# Patient Record
Sex: FEMALE | Race: WHITE | Employment: OTHER | URBAN - METROPOLITAN AREA
[De-identification: names, ages, dates, MRNs, and addresses within clinical notes are randomized per-mention and may not be internally consistent; named-entity substitution may affect disease eponyms.]

---

## 2017-03-07 ENCOUNTER — HOSPITAL ENCOUNTER (OUTPATIENT)
Dept: WOMENS IMAGING | Age: 77
Discharge: HOME | End: 2017-03-07
Attending: FAMILY MEDICINE | Admitting: FAMILY MEDICINE

## 2017-03-07 DIAGNOSIS — Z12.31 VISIT FOR SCREENING MAMMOGRAM: ICD-10-CM

## 2018-02-05 ENCOUNTER — PAT TELEPHONE (OUTPATIENT)
Dept: PREADMISSION TESTING | Age: 78
End: 2018-02-05

## 2018-02-05 VITALS — BODY MASS INDEX: 28.47 KG/M2 | HEIGHT: 60 IN | WEIGHT: 145 LBS

## 2018-02-05 RX ORDER — DICYCLOMINE HYDROCHLORIDE 10 MG/1
10 CAPSULE ORAL
COMMUNITY

## 2018-02-05 NOTE — PROGRESS NOTES
[address] [facility] time___0800_________        Surgery time__0900__________    Take the following medications with a sip of water:    Do not eat or drink anything after 12:00 midnight prior to your surgery. PT TO GET ENEMAS UPON ARRIVAL  This includes water chewing gum, mints and ice chips. You may brush your teeth and gargle the morning of your surgery, but do not swallow the water       You may be asked to stop blood thinners such as Coumadin, Plavix, Fragmin, Lovenox, etc., or any anti-inflammatories such as:  Aspirin, Ibuprofen, Advil, Naproxen prior to your surgery. We also ask that you stop any OTC medications such as fish oil, vitamin E, glucosamine, garlic, Multivitamins, COQ 10, etc.    We ask that you do not smoke 24 hours prior to surgery  We ask that you do not  drink any alcoholic beverages 24 hours prior to surgery     You must make arrangements for a responsible adult to take you home after your surgery. For your safety you will not be allowed to leave alone or drive yourself home. Your surgery will be cancelled if you do not have a ride home. Also for your safety, it is strongly suggested that someone stay with you the first 24 hours after your surgery. A parent or legal guardian must accompany a child scheduled for surgery and plan to stay at the hospital until the child is discharged. Please do not bring other children with you. For your comfort, please wear simple loose fitting clothing to the hospital.  Please do not bring valuables. Do not wear any make-up or nail polish on your fingers or toes      For your safety, please do not wear any jewelry or body piercing's on the day of surgery. All jewelry must be removed. If you have dentures, they will be removed before going to operating room. For your convenience, we will provide you with a container.     If you wear contact lenses or glasses, they will be removed, please

## 2018-02-13 ENCOUNTER — HOSPITAL ENCOUNTER (OUTPATIENT)
Dept: ENDOSCOPY | Age: 78
Discharge: HOME | End: 2018-02-13
Attending: INTERNAL MEDICINE | Admitting: INTERNAL MEDICINE

## 2018-02-13 VITALS
SYSTOLIC BLOOD PRESSURE: 110 MMHG | WEIGHT: 146.5 LBS | RESPIRATION RATE: 16 BRPM | DIASTOLIC BLOOD PRESSURE: 59 MMHG | OXYGEN SATURATION: 98 % | TEMPERATURE: 97.9 F | HEART RATE: 59 BPM | BODY MASS INDEX: 28.61 KG/M2

## 2018-02-13 RX ORDER — ONDANSETRON 2 MG/ML
4 INJECTION INTRAMUSCULAR; INTRAVENOUS
Status: ACTIVE | OUTPATIENT
Start: 2018-02-13 | End: 2018-02-13

## 2018-02-13 RX ORDER — SODIUM CHLORIDE 9 MG/ML
INJECTION, SOLUTION INTRAVENOUS CONTINUOUS
Status: DISCONTINUED | OUTPATIENT
Start: 2018-02-13 | End: 2018-02-14 | Stop reason: HOSPADM

## 2018-02-13 RX ORDER — SODIUM CHLORIDE 0.9 % (FLUSH) 0.9 %
10 SYRINGE (ML) INJECTION EVERY 12 HOURS SCHEDULED
Status: DISCONTINUED | OUTPATIENT
Start: 2018-02-13 | End: 2018-02-14 | Stop reason: HOSPADM

## 2018-02-13 RX ORDER — SODIUM PHOSPHATE, DIBASIC AND SODIUM PHOSPHATE, MONOBASIC 7; 19 G/133ML; G/133ML
1 ENEMA RECTAL
Status: COMPLETED | OUTPATIENT
Start: 2018-02-13 | End: 2018-02-13

## 2018-02-13 RX ORDER — SODIUM CHLORIDE 0.9 % (FLUSH) 0.9 %
10 SYRINGE (ML) INJECTION PRN
Status: DISCONTINUED | OUTPATIENT
Start: 2018-02-13 | End: 2018-02-14 | Stop reason: HOSPADM

## 2018-02-13 RX ADMIN — SODIUM CHLORIDE: 9 INJECTION, SOLUTION INTRAVENOUS at 08:46

## 2018-02-13 RX ADMIN — SODIUM PHOSPHATE, DIBASIC AND SODIUM PHOSPHATE, MONOBASIC 1 ENEMA: 7; 19 ENEMA RECTAL at 08:34

## 2018-02-13 ASSESSMENT — LIFESTYLE VARIABLES: SMOKING_STATUS: 0

## 2018-02-13 ASSESSMENT — PAIN SCALES - GENERAL
PAINLEVEL_OUTOF10: 0

## 2018-02-13 ASSESSMENT — ENCOUNTER SYMPTOMS: SHORTNESS OF BREATH: 0

## 2018-02-13 ASSESSMENT — PAIN - FUNCTIONAL ASSESSMENT: PAIN_FUNCTIONAL_ASSESSMENT: 0-10

## 2018-02-13 NOTE — ANESTHESIA PRE-OP
[facility] Department of Anesthesiology  Pre-Anesthesia Evaluation/Consultation       Name:  Faviola Samuel  : 1940  Age:  65 y.o. MRN:  [id number]  Date: 2018           Procedure (Scheduled):  Flex sig  Surgeon:  Dr. Scott Jacome     There is no problem list on file for this patient.     Past Medical History:   Diagnosis Date    Hyperlipidemia     Hypertension      Past Surgical History:   Procedure Laterality Date    APPENDECTOMY      COLONOSCOPY  2016    Dr. Danisha Ludwig ARTHROSCOPY       Social History   Substance Use Topics    Smoking status: Never Smoker    Smokeless tobacco: Never Used    Alcohol use Yes     Medications  Current Outpatient Prescriptions on File Prior to Encounter   Medication Sig Dispense Refill    dicyclomine (BENTYL) 10 MG capsule Take 10 mg by mouth 4 times daily (before meals and nightly)      levothyroxine (SYNTHROID) 88 MCG tablet Take 88 mcg by mouth Daily      hydrochlorothiazide (HYDRODIURIL) 25 MG tablet Take 25 mg by mouth daily      lovastatin (MEVACOR) 10 MG tablet Take 10 mg by mouth nightly      estrogens, conjugated, (PREMARIN) 0.3 MG tablet Take 0.3 mg by mouth daily Indications: takes 1/2 tab      fluticasone (FLONASE) 50 MCG/ACT nasal spray 1 spray by Nasal route daily      Loratadine 10 MG CAPS Take by mouth      Probiotic Product (ALIGN PO) Take by mouth      omeprazole (PRILOSEC) 20 MG delayed release capsule Take 20 mg by mouth daily      OMEGA-3 KRILL OIL PO Take 350 mg by mouth daily      Coenzyme Q10 (CO Q 10 PO) Take by mouth daily      celecoxib (CELEBREX) 200 MG capsule Take 200 mg by mouth 2 times daily      Cholecalciferol (VITAMIN D) 2000 UNITS CAPS capsule Take by mouth      Psyllium (METAMUCIL PO) Take by mouth      Zinc 30 MG CAPS Take by mouth      aspirin 81 MG tablet Take 81 mg by mouth daily Indications: takes every other day       No current facility-administered medications on file prior to encounter. Current Outpatient Prescriptions   Medication Sig Dispense Refill    dicyclomine (BENTYL) 10 MG capsule Take 10 mg by mouth 4 times daily (before meals and nightly)      levothyroxine (SYNTHROID) 88 MCG tablet Take 88 mcg by mouth Daily      hydrochlorothiazide (HYDRODIURIL) 25 MG tablet Take 25 mg by mouth daily      lovastatin (MEVACOR) 10 MG tablet Take 10 mg by mouth nightly      estrogens, conjugated, (PREMARIN) 0.3 MG tablet Take 0.3 mg by mouth daily Indications: takes 1/2 tab      fluticasone (FLONASE) 50 MCG/ACT nasal spray 1 spray by Nasal route daily      Loratadine 10 MG CAPS Take by mouth      Probiotic Product (ALIGN PO) Take by mouth      omeprazole (PRILOSEC) 20 MG delayed release capsule Take 20 mg by mouth daily      OMEGA-3 KRILL OIL PO Take 350 mg by mouth daily      Coenzyme Q10 (CO Q 10 PO) Take by mouth daily      celecoxib (CELEBREX) 200 MG capsule Take 200 mg by mouth 2 times daily      Cholecalciferol (VITAMIN D) 2000 UNITS CAPS capsule Take by mouth      Psyllium (METAMUCIL PO) Take by mouth      Zinc 30 MG CAPS Take by mouth      aspirin 81 MG tablet Take 81 mg by mouth daily Indications: takes every other day       Current Facility-Administered Medications   Medication Dose Route Frequency Provider Last Rate Last Dose    sodium chloride flush 0.9 % injection 10 mL  10 mL Intravenous 2 times per day Mortimer Plaster, MD        sodium chloride flush 0.9 % injection 10 mL  10 mL Intravenous PRN Mortimer Plaster, MD         Vital Signs (Current) There were no vitals filed for this visit. Vital Signs Statistics (for past 48 hrs)     No Data Recorded    BP Readings from Last 3 Encounters:   11/01/16 133/75     BMI  There is no height or weight on file to calculate BMI.   Estimated body mass index is

## 2018-02-13 NOTE — ANESTHESIA POST-OP
[facility] Department of Anesthesiology  Post-Anesthesia Note       Name:  Kirti Holt                                         Age:  65 y.o.   MRN:  [id number]     Last Vitals & Oxygen Saturation: BP (!) 110/59   Pulse 59   Temp 97.9 °F (36.6 °C) (Temporal)   Resp 16   Wt 146 lb 8 oz (66.5 kg)   SpO2 98%   BMI 28.61 kg/m²   Patient Vitals for the past 4 hrs:   BP Temp Temp src Pulse Resp SpO2 Weight   02/13/18 0937 (!) 110/59 - - 59 16 98 % -   02/13/18 0923 (!) 102/58 - - 59 18 99 % -   02/13/18 0915 (!) 104/49 97.9 °F (36.6 °C) Temporal 63 16 90 % -   02/13/18 0827 132/79 97.9 °F (36.6 °C) Temporal 75 16 95 % 146 lb 8 oz (66.5 kg)       Level of consciousness: awake, alert and oriented    Respiratory: stable     Cardiovascular: stable     Hydration: stable     PONV: stable     Post-op pain: adequate analgesia    Post-op assessment: no apparent anesthetic complications    Complications:  none    Abhishek Perry MD  February 13, 2018   9:41 AM

## 2018-04-13 ENCOUNTER — HOSPITAL ENCOUNTER (OUTPATIENT)
Dept: WOMENS IMAGING | Age: 78
Discharge: HOME | End: 2018-04-13
Attending: FAMILY MEDICINE | Admitting: FAMILY MEDICINE

## 2018-04-13 DIAGNOSIS — Z12.31 VISIT FOR SCREENING MAMMOGRAM: ICD-10-CM

## 2019-07-19 ENCOUNTER — HOSPITAL ENCOUNTER (OUTPATIENT)
Dept: WOMENS IMAGING | Age: 79
Discharge: HOME | End: 2019-07-19
Payer: MEDICARE

## 2019-07-19 DIAGNOSIS — Z12.31 VISIT FOR SCREENING MAMMOGRAM: ICD-10-CM

## 2019-07-19 PROCEDURE — 77063 BREAST TOMOSYNTHESIS BI: CPT

## 2020-09-24 ENCOUNTER — HOSPITAL ENCOUNTER (OUTPATIENT)
Dept: PHYSICAL THERAPY | Age: 80
Setting detail: THERAPIES SERIES
Discharge: HOME | End: 2020-09-24
Payer: MEDICARE

## 2020-09-24 PROCEDURE — 97110 THERAPEUTIC EXERCISES: CPT | Performed by: PHYSICAL THERAPIST

## 2020-09-24 PROCEDURE — 97530 THERAPEUTIC ACTIVITIES: CPT | Performed by: PHYSICAL THERAPIST

## 2020-09-24 PROCEDURE — 97162 PT EVAL MOD COMPLEX 30 MIN: CPT | Performed by: PHYSICAL THERAPIST

## 2020-09-24 NOTE — PRE-CERTIFICATION NOTE
[facility] - Physical Therapy - Women's Health - Pelvic Floor    [x] [facility]  Phone: [phone]              Fax: [phone]    To: Referring Practitioner: Bryn Rodriguez MD      Patient: Juan Pablo Brasher   : 1940   MRN: [id number]  Evaluation Date: 2020      Diagnosis Information:  · Diagnosis: PF PT - urgency, frequency, UUI, incomplete emptying (R33.9, R39.15, N39.41, R35.0)   · Treatment Diagnosis: impaired muscle control and coordination of pelvic floor muscles and core muscles contributing to urinary frequency and urgency     Physical Therapy Certification/Re-Certification Form  Dear Dr. Jesse Ramirez,  The following patient has been evaluated for physical therapy services and for therapy to continue, insurance typically requires intermittent physician review of the treatment plan. Please review the attached evaluation and/or summary of the patient's plan of care, and verify that you agree therapy should continue by signing the attached document and sending it back to our office. Plan of Care/Treatment to date:  [x] Therapeutic Exercise    [] Modalities:  [x] Therapeutic Activity     [] Ultrasound  [] Electrical Stimulation  [] Gait Training      [] Cervical Traction [] Lumbar Traction  [x] Neuromuscular Re-education    [] Cold/hotpack [] Iontophoresis   [x] Instruction in HEP     Other:  [x] Manual Therapy      []             [] Aquatic Therapy      []                 Frequency/Duration:  See patient every 1-2 weeks initially to ensure patient is performing pelvic floor muscle exercises correctly and then taper, determining frequency of treatments based on progress, for a total for 8-10 sessions. Rehab Potential: [] Excellent [x] Good [] Fair  [] Poor       Electronically signed by:  Margret Bowles, PT  #[id number]      If you have any questions or concerns, please don't hesitate to call.   Thank you for your referral.      Physician Signature:________________________________Date:__________________  By signing above, therapists plan is approved by physician

## 2020-09-24 NOTE — PROGRESS NOTES
Physical Therapy  Women's Health - Pelvic Floor      Initial Assessment and Daily Treatment Note  Date: 2020  Patient Name: Juan Pablo Brasher  MRN: [id number]  : 1940     Treatment Diagnosis: impaired muscle control and coordination of pelvic floor muscles and core muscles contributing to urinary frequency and urgency    Restrictions   NA    Subjective   General  Additional Pertinent Hx: PMH: low back pain, SI/tailbone pain, vision and hearing deficits, osteoporosis, arthritis, bone fracture, IBS, Chron's disease, vaginal childbirth x 4 with episiotomies, prolapse with surgery to correct, hysterectomy  Referring Practitioner: Bryn Rodriguez MD  Referral Date : 20  Diagnosis: PF PT - urgency, frequency, UUI, incomplete emptying (R33.9, R39.15, N39.41, R35.0)  PT Visit Information  Onset Date: 09/15/17  PT Insurance Information: Medicare/ Notizza  Total # of Visits to Date: 1  Subjective  Subjective: Patient reports need to urinate frequenly and often incomplete emptying which began about 3 years ago, but made worse by bladder surgery 2019. Getting slight better with taking medications, but seeking PF PT to be able to sleep better and not have to get up so many times at night - generally up to 3 times/night - \"makes me tired from lack of sleep. \" Reports limiting fluids at night/evening. Severity of problem 5/10. Uses incontinence pads - moderate to max protection ~ 2 pads/day - for both urine and stool due to Chron's disease. Vision/Hearing  Vision  Vision: Impaired(corrected with bifocals)  Hearing  Hearing: Within functional limits(some difficulty hearing but no hear aids)    Orientation       Social/Functional History  Social/Functional History  Type of occupation: housewife  Leisure & Hobbies: [employer], cards  Additional Comments:  with cancer, recently fell and getting weaker - serves as primary caregiver for spouse.     Objective health/irritants, helpful websites resource, constipation, bowel leakage, prolapse, and PF muscle exercises. Discussed use of positional stool with BMs. Educated in PF muscle assessment and treatment using PF model. Patient consented to transvaginal PF muscle assessment and treatment. Assessment   Conditions Requiring Skilled Therapeutic Intervention  Body structures, Functions, Activity limitations: Decreased functional mobility ; Decreased endurance;Decreased strength;Decreased coordination(in regards to PF muscles)  Assessment: Patient presents to PF PT with complaint of needing to urinate frequenly and often incomplete emptying which began about 3 years ago, but made worse by bladder surgery 1/2019. She reports this is improving slightly since she began taking medications, but is seeking PF PT to be able to sleep better and not have to get up so many times at night - generally up to 3 times/night - \"makes me tired from lack of sleep. \"  Patient rates the severity of problem at 5/10. She uses incontinence pads - moderate to max protection ~ 2 pads/day - for both urine and stool due to Chron's disease, with which she was recently diagnosed and which seems to be further complicating her life. After reviewing bowel/bladder behavioral modification information, PT used a pelvic floor model to orient the patient with her pelvic organ and pelvic floor muscles anatomy. Patient verbally consented to a transvaginal pelvic muscle floor muscle assessment which revealed weakness/impaired muscle control. Upon transvaginal pelvic floor muscle examination, patient demonstrates poor to fair control/power of her pelvic floor muscles with inconsistent mind-body connection, and limited coordination. With minimal coaching and coordinating pelvic floor contractions with exhalation during diaphragmatic breathing, patient was able to demonstrate up to 1-2/5 muscle contraction for up to 5 seconds (10 seconds is normal).  Patient would definitely benefit from pelvic floor physical therapy for continued education on healthy bladder/bowel habits and behavioral modifications as well as to improve muscle control/ coordination and endurance of pelvic floor and core muscles, which should ultimately diminish report of urinary frequency, urgency, and incomplete emptying. Patient would like to be able to return to decrease urinary frequency to less than 3 times each night  to allow improved restful night's sleep.     Treatment Diagnosis: impaired muscle control and coordination of pelvic floor muscles and core muscles contributing to urinary frequency and urgency  Prognosis: Good  Decision Making: Medium Complexity  History: PMH: low back pain, SI/tailbone pain, vision and hearing deficits, osteoporosis, arthritis, bone fracture, IBS, Chron's disease, vaginal childbirth x 4 with episiotomies, prolapse with surgery to correct, hysterectomy  Exam: impaired muscle control and coordination of pelvic floor muscles and core muscles contributing to urinary frequency and urgency, see above  Clinical Presentation: evolving clinical symptoms, worse over last 3 years, but improved slightly with medications  REQUIRES PT FOLLOW UP: Yes  Treatment Initiated : 9/24/2020 -  initiated education about healthy bowel and bladder habits, initiated pelvic floor muscle control activities  Activity Tolerance  Activity Tolerance: Patient Tolerated treatment well         Plan   Plan  Specific instructions for Next Treatment: review pelvic floor ex coordinating with diaphragmatic breathing, progress to advanced positions as able; assess behavior modification strategies to promote healthy bowel/bladder habits  Current Treatment Recommendations: Strengthening, Functional Mobility Training, Neuromuscular Re-education, Endurance Training, Manual Therapy - Soft Tissue Mobilization, Home Exercise Program, Safety Education & Training, Patient/Caregiver Education & Training, Positioning  Plan Comment: See patient every 1-2 weeks initially to ensure patient is performing pelvic floor muscle exercises correctly and then taper, determining frequency of treatments based on progress, for a total for 8-10 sessions. Next scheduled appointment on Thursday, 10/1/2020 at 1PM - prefers late AM or PM due to Chron's. OutComes Score  POPDI-6 Score : 20.83 (09/24/20 1238)     CRADI-8 Score : 37.5 (09/24/20 1238)    ROCKY-6 Score : 20.83 (09/24/20 1238)     PFDI-20 Score: 79.16 (09/24/20 1238)             Goals  Short term goals  Time Frame for Short term goals: Patient will in 4-5 sessions:  Short term goal 1: Report using 1-2 behavioral modification strategies to reduce report of urinary frequency and urgency through dietary and mechanical changes, with focus on full emptying of bladder and strengthening/improving endurance of PF muscles. Short term goal 2: Improve score of quality of life index measure, PFDI-20, to 55 (79.16 upon initial eval) or less disability index demonstrating improved quality of life with less life interruptions due to urinary frequency and urgency, allowing patient to fully participate in social activities, including spending time with her spouse/friends. Short term goal 3: Perform HEP for pelvic floor and core muscle groups with minimal direction from therapist as she progresses to become more independent managing her urinary frequency and urgency. Short term goal 4: Rate severity of the problem related to urinary frequency and urgency, to less than 3-4 on scale of 0-10 with 0 being no problem and 10 being the worst - (5/10 reported at intial eval- does not allow urinary urgency and frequency disturb her social life - just wears moderate pad in ancitipation of issues when out of her home). Short term goal 5: Report urinary frequency decrease to no more than 2 times per night on average.   Long term goals  Time Frame for Long term goals : Patient will in 8-10 sessions:  Long term goal 1: Report using 3-4 behavioral modification strategies to reduce report of urinary frequency and urgency through dietary and mechanical changes, with focus on full emptying of bladder and strengthening/improving endurance of PF muscles. Long term goal 2: Improve score of quality of life index measure, PFDI-20, to 40 (79.16 upon initial eval) or less disability index demonstrating improved quality of life with less life interruptions due to urinary frequency and urgency, allowing patient to fully participate in social activities, including spending time with her spouse/friends. Long term goal 3: Perform HEP for pelvic floor and core muscle groups independently as she progresses to self-manage her urinary frequency and urgency. Long term goal 4: Rate severity of the problem related to urinary frequency and urgency, to less than 2-3 on scale of 0-10 with 0 being no problem and 10 being the worst - (5/10 reported at intial eval- does not allow urinary urgency and frequency disturb her social life - just wears moderate pad in ancitipation of issues when out of her home). Long term goal 5: Report urinary frequency decrease to no more than 1-2 times per night on average. Patient Goals   Patient goals : to be able to sleep better and not have to get up so many times at night - generally up to 3 times/night - \"makes me tired from lack of sleep. \"       Therapy Time   Individual Concurrent Group Co-treatment   Time In 1100         Time Out 1230         Minutes 90         Timed Code Treatment Minutes: Niall, PT #[id number]

## 2020-10-01 ENCOUNTER — HOSPITAL ENCOUNTER (OUTPATIENT)
Dept: PHYSICAL THERAPY | Age: 80
Setting detail: THERAPIES SERIES
Discharge: HOME | End: 2020-10-01
Payer: MEDICARE

## 2020-10-01 PROCEDURE — 97110 THERAPEUTIC EXERCISES: CPT | Performed by: PHYSICAL THERAPIST

## 2020-10-01 PROCEDURE — 97530 THERAPEUTIC ACTIVITIES: CPT | Performed by: PHYSICAL THERAPIST

## 2020-10-15 ENCOUNTER — HOSPITAL ENCOUNTER (OUTPATIENT)
Dept: PHYSICAL THERAPY | Age: 80
Setting detail: THERAPIES SERIES
Discharge: HOME | End: 2020-10-15
Payer: MEDICARE

## 2020-10-15 PROCEDURE — 97110 THERAPEUTIC EXERCISES: CPT | Performed by: PHYSICAL THERAPIST

## 2020-10-15 PROCEDURE — 97530 THERAPEUTIC ACTIVITIES: CPT | Performed by: PHYSICAL THERAPIST

## 2020-10-27 ENCOUNTER — HOSPITAL ENCOUNTER (OUTPATIENT)
Dept: PHYSICAL THERAPY | Age: 80
Setting detail: THERAPIES SERIES
Discharge: HOME | End: 2020-10-27
Payer: MEDICARE

## 2020-10-27 PROCEDURE — 97110 THERAPEUTIC EXERCISES: CPT | Performed by: PHYSICAL THERAPIST

## 2020-10-27 PROCEDURE — 97530 THERAPEUTIC ACTIVITIES: CPT | Performed by: PHYSICAL THERAPIST

## 2020-10-27 NOTE — PROGRESS NOTES
Physical Therapy  Women's Health - Pelvic Floor      Daily Treatment Note  Date: 10/27/2020  Patient Name: Uri Gorman  MRN: [id number]     :   1940    Subjective:   General  Additional Pertinent Hx: PMH: low back pain, SI/tailbone pain, vision and hearing deficits, osteoporosis, arthritis, bone fracture, IBS, Chron's disease, vaginal childbirth x 4 with episiotomies, prolapse with surgery to correct, hysterectomy  Referring Practitioner: Debra Reyna MD  PT Visit Information  Onset Date: 09/15/17  PT Insurance Information: Medicare/ [employer]  Total # of Visits to Date: 4  Subjective  Subjective: Patient feels that her PF muscles are getting better/stronger. Feels less likely to have bowel accident. Patient had to switch medication for Chron's disease - to help control urination - decrease urgency and frequency - but hurting her teeth, so switched. Felt like she could hold urine better with first medication, and new medication has resulted in increased frequency of urine. Has now been getting up at night about 3 times - sometimes gets up to move around since her legs hurt. Bowel leakage \"only if I have the wrong things to eat\" - ate pie for her birthday - didn't realize that pie might contain gluten.           Treatment Activities:                                      Exercises  Exercise 1: Diaphragmatic breathing - in isolation and then coordinating with pelvic floor muscle activities  Exercise 2: Pelvic floor contraction/elevation - supine - transvaginal manual feedback - HOLD - power of 2+/5 - variable, about 8 second hold - 10 reps - verbal and tactile cues to coordinate with pelvic floor muscles with diaphragmatic breathing, tactile cues on stomach with GENTLE upward and inward motion with hands to facilitate - cues to decrease depth and speed of inhalation to prevent excessive PF descent prior to contraction/elevation  Exercise 3: Pelvic floor contraction/elevation - supine - transvaginal manual feedback - QUICK FLICKS - power of 2+/5, 10 reps x 2 - cues to NOT take deep breath to begin, first set with breathing which was more confusing and less effective contractions than second set without focus on breathing  Exercise 4: Pelvic floor contraction/elevation - seated - self-monitoring - HOLD - up to 3-5 second hold - 10 reps - verbal and tactile cues to coordinate with pelvic floor muscles with diaphragmatic breathing, cues to fully relax and take 2-3 normal breaths inbetween reps  Exercise 5: Pelvic floor contraction/elevation - seated - self-monitoring - QUICK FLICKS - 10 reps  -  improved with tactile cues on stomach  Exercise 6: Core strengtheing - supine/hooklying - posterior pelvic tilts - hold 5- 10 seconds x 10 reps  Exercise 7: Core strengtheing - supine/hooklying - posterior pelvic tilts with alternating shoulder flex - hold 5 seconds x 10 reps each UE - verbal and tactile cues to contract abdomen prior to moving extremities  Exercise 8: Core strengtheing - supine/hooklying - posterior pelvic tilts with alternating hip flex - hold 5 seconds x 10 reps each UE - verbal and tactile cues to contract abdomen prior to moving extremities  Exercise 9: Pelvic floor contraction/elevation - standing - self-monitoring - HOLD - up to 2-4+ second hold - 10 reps - more automatic to coordinate with pelvic floor muscles with diaphragmatic breathing  Exercise 10: Pelvic floor contraction/elevation - standing - self-monitoring - QUICK FLICKS - 10 reps  - sluggish, improved with tactile cues on stomach  Exercise 11: Core strengtheing - supine/hooklying - posterior pelvic tilts with alternating opposite shoulder/hip flex - hold 2-3 seconds x 10 reps each UE - verbal and tactile cues to contract abdomen prior to moving extremities  Other Activities  Other Activities: Other (see comment)  Comment: Patient consented to transvaginal PF muscle assessment and treatment.                                 Assessment: Conditions Requiring Skilled Therapeutic Intervention  Body structures, Functions, Activity limitations: Decreased functional mobility ; Decreased endurance;Decreased strength;Decreased coordination(in regards to PF muscles)  Assessment: Patient reports increased need to urinate at night, up to 3 times over last couple of weeks, but may be due to change in medication and need to move at night due to leg stiffness. Patient having some bowel issues, but mostly because she is not always aware of what foods may contain gluten, which aggravate her Chron's disease. Patient verbally consented to transvaginal pelvic muscle floor muscle assessment/feedback which continues to reveal weakness/impaired muscle control and coordination. With minimal coaching, tactile cues on abdomen and coordinating pelvic floor contractions with exhalation during diaphragmatic breathing, patient was able to demonstrate up to 2+/5 muscle contraction for up to 8 seconds (10 seconds is normal). Patient was able to advance core strengthening exercises and reviewed PF muscle control activities in all positions. Continues to need cues for breathing techniques during PF muscle control activities. Patient would continue to benefit from pelvic floor physical therapy for continued education on healthy bladder/bowel habits and behavioral modifications as well as to improve muscle control/ coordination and endurance of pelvic floor and core muscles, which should ultimately diminish report of urinary frequency, urgency, and incomplete emptying. Will continue to monitor with changes in medications for bladder control.      Treatment Diagnosis: impaired muscle control and coordination of pelvic floor muscles and core muscles contributing to urinary frequency and urgency  Prognosis: Good  Decision Making: Medium Complexity  History: PMH: low back pain, SI/tailbone pain, vision and hearing deficits, osteoporosis, arthritis, bone fracture, IBS, Chron's disease, vaginal childbirth x 4 with episiotomies, prolapse with surgery to correct, hysterectomy  Exam: impaired muscle control and coordination of pelvic floor muscles and core muscles contributing to urinary frequency and urgency, see above  Clinical Presentation: evolving clinical symptoms, worse over last 3 years, but improved slightly with medications  REQUIRES PT FOLLOW UP: Yes  Treatment Initiated : 9/24/2020 -  initiated education about healthy bowel and bladder habits, initiated pelvic floor muscle control activities        Goals:  Short term goals  Time Frame for Short term goals: Patient will in 4-5 sessions:  Short term goal 1: Report using 1-2 behavioral modification strategies to reduce report of urinary frequency and urgency through dietary and mechanical changes, with focus on full emptying of bladder and strengthening/improving endurance of PF muscles. - met - 10/15/2020  Short term goal 2: Improve score of quality of life index measure, PFDI-20, to 55 (79.16 upon initial eval) or less disability index demonstrating improved quality of life with less life interruptions due to urinary frequency and urgency, allowing patient to fully participate in social activities, including spending time with her spouse/friends. Short term goal 3: Perform HEP for pelvic floor and core muscle groups with minimal direction from therapist as she progresses to become more independent managing her urinary frequency and urgency. Short term goal 4: Rate severity of the problem related to urinary frequency and urgency, to less than 3-4 on scale of 0-10 with 0 being no problem and 10 being the worst - (5/10 reported at intial eval- does not allow urinary urgency and frequency disturb her social life - just wears moderate pad in ancitipation of issues when out of her home).   Short term goal 5: Report urinary frequency decrease to no more than 2 times per night on average. - met - 10/15/2020  Long term goals  Time Frame for Long term goals : Patient will in 8-10 sessions:  Long term goal 1: Report using 3-4 behavioral modification strategies to reduce report of urinary frequency and urgency through dietary and mechanical changes, with focus on full emptying of bladder and strengthening/improving endurance of PF muscles. Long term goal 2: Improve score of quality of life index measure, PFDI-20, to 40 (79.16 upon initial eval) or less disability index demonstrating improved quality of life with less life interruptions due to urinary frequency and urgency, allowing patient to fully participate in social activities, including spending time with her spouse/friends. Long term goal 3: Perform HEP for pelvic floor and core muscle groups independently as she progresses to self-manage her urinary frequency and urgency. Long term goal 4: Rate severity of the problem related to urinary frequency and urgency, to less than 2-3 on scale of 0-10 with 0 being no problem and 10 being the worst - (5/10 reported at intial eval- does not allow urinary urgency and frequency disturb her social life - just wears moderate pad in ancitipation of issues when out of her home). Long term goal 5: Report urinary frequency decrease to no more than 1-2 times per night on average. Patient Goals   Patient goals : to be able to sleep better and not have to get up so many times at night - generally up to 3 times/night - \"makes me tired from lack of sleep. \"    Plan:    Plan  Specific instructions for Next Treatment: review pelvic floor ex coordinating with diaphragmatic breathing, progress to advanced positions as able; assess behavior modification strategies to promote healthy bowel/bladder habits  Current Treatment Recommendations: Strengthening, Functional Mobility Training, Neuromuscular Re-education, Endurance Training, Manual Therapy - Soft Tissue Mobilization, Home Exercise Program, Safety Education & Training, Patient/Caregiver Education & Training, Positioning  Plan Comment: See patient every 1-2 weeks initially to ensure patient is performing pelvic floor muscle exercises correctly and then taper, determining frequency of treatments based on progress, for a total for 8-10 sessions. Paient showing improvement and wishes to extend rafa between visits to 3 weeks prior to next follow. Next scheduled appointment on Tuesday, 11/17/2020 at 11:15AM - prefers late AM or PM due to Chron's.   Timed Code Treatment Minutes: 55 Minutes     Therapy Time   Individual Concurrent Group Co-treatment   Time In 6335         Time Out 1115         Minutes 55         Timed Code Treatment Minutes: [address] [facility], PT #[id number]

## 2020-11-17 ENCOUNTER — HOSPITAL ENCOUNTER (OUTPATIENT)
Dept: PHYSICAL THERAPY | Age: 80
Setting detail: THERAPIES SERIES
Discharge: HOME | End: 2020-11-17
Payer: MEDICARE

## 2020-11-17 PROCEDURE — 97530 THERAPEUTIC ACTIVITIES: CPT | Performed by: PHYSICAL THERAPIST

## 2020-11-17 PROCEDURE — 97110 THERAPEUTIC EXERCISES: CPT | Performed by: PHYSICAL THERAPIST

## 2020-11-17 NOTE — PROGRESS NOTES
Physical Therapy  Women's Health - Pelvic Floor      Daily Treatment Note  Date: 2020  Patient Name: Dexter Olsen  MRN: [id number]     :   1940    Subjective:   General  Additional Pertinent Hx: PMH: low back pain, SI/tailbone pain, vision and hearing deficits, osteoporosis, arthritis, bone fracture, IBS, Chron's disease, vaginal childbirth x 4 with episiotomies, prolapse with surgery to correct, hysterectomy  Referring Practitioner: Deb Monterroso MD  PT Visit Information  Onset Date: 09/15/17  PT Insurance Information: Medicare/ [facility]  Total # of Visits to Date: 5  Subjective  Subjective: Patient has been busy with other medical issues requiring multiple tests, including MRI, for Chron's disease. Patient now being placed on steroids, not keen on idea, but agreeable. Overall, improved bladder control - taking certain medication to control bladder, definitely helping. Improved with bowel control - never had bowel accident - complains of urgency with bowel movements, but since that she has been home that she has been able to avoid any bowel accidents. Denies any bladder or bowel incontinence generally speaking - leaked urine twice in 3 weeks, likely because she is waiting too long. Reports getting up to urinate about twice every night. Patient is interested in following up with PF PT in about 3-4 weeks after this appointment to ensure that all is going well and there are no other issues to address. Reports generally doing PF muscle control exercises and core muscle strengthening fairly well, but continues to struggle coordinating opposite shoulder/hip flex with supine/hooklying core stabilization activities.           Treatment Activities:                                      Exercises  Exercise 1: Diaphragmatic breathing - in isolation and then coordinating with pelvic floor muscle activities  Exercise 2: Pelvic floor contraction/elevation - supine - transvaginal manual feedback - HOLD - floor contraction/elevation - standing - self-monitoring - QUICK FLICKS - 10 reps  - sluggish, improved with tactile cues on stomach  Exercise 11: Core strengtheing - supine/hooklying - posterior pelvic tilts with alternating opposite shoulder/hip flex - hold 2-3 seconds x 10 reps each UE - verbal and tactile cues to contract abdomen prior to moving extremities, cues to decrease amplitude of movment and focus on core stabilization  Other Activities  Other Activities: Other (see comment)  Comment: Patient consented to transvaginal PF muscle assessment and treatment. Reviewed need for behvioral modifications - need to urinate every 2-3 hours so as to not let the bladder to overfill. Use of quick flicks with urgency, but may be able to delay/eliminate urgency if she urinates on a schedule/in regular, more frequent timeframes. Assessment:   Conditions Requiring Skilled Therapeutic Intervention  Body structures, Functions, Activity limitations: Decreased functional mobility ; Decreased endurance;Decreased strength;Decreased coordination(in regards to PF muscles)  Assessment:  Patient has met all short term goals and is progressing toward long term goals. Patient continues with further testing and adjustment in treatments for Chron's disease. Overall, less urinary incontinence, frequency, and urgency. Patient reports urinary incontience twice in last 3 weeks - mostly when she doesn't urinate frequently enough. Reviewed need to urinate every 2-3 hours to prevent bladder from becoming excessively full. Patient verbally consented to transvaginal pelvic muscle floor muscle assessment/feedback which reveals improving muscle control and coordination.  With minimal coaching, tactile cues on abdomen and coordinating pelvic floor contractions with exhalation during diaphragmatic breathing, patient was able to demonstrate up to 2+-3-/5 muscle contraction for up to 8+ seconds (10 seconds is normal). Patient was able to review core strengthening exercises and reviewed PF muscle control activities in all positions. Continues to need intermittent cues for breathing techniques during PF muscle control activities. Patient would continue to benefit from pelvic floor physical therapy for continued education on healthy bladder/bowel habits and behavioral modifications as well as to improve muscle control/ coordination and endurance of pelvic floor and core muscles, which should ultimately diminish report of urinary frequency, urgency, and incomplete emptying. Will continue to monitor with changes in medications for bladder control. Patient appropriate for follow up in ~3 weeks so that she can continue to work on muscle control and core strengthening on her own. Will reassess readiness for discharge at next appointment.        Treatment Diagnosis: impaired muscle control and coordination of pelvic floor muscles and core muscles contributing to urinary frequency and urgency  Prognosis: Good  Decision Making: Medium Complexity  History: PMH: low back pain, SI/tailbone pain, vision and hearing deficits, osteoporosis, arthritis, bone fracture, IBS, Chron's disease, vaginal childbirth x 4 with episiotomies, prolapse with surgery to correct, hysterectomy  Exam: impaired muscle control and coordination of pelvic floor muscles and core muscles contributing to urinary frequency and urgency, see above  REQUIRES PT FOLLOW UP: Yes  Treatment Initiated : 9/24/2020 -  initiated education about healthy bowel and bladder habits, initiated pelvic floor muscle control activities      OutComes Score  POPDI-6 Score : 12.5 (11/17/20 1321)     CRADI-8 Score : 34.38 (11/17/20 1321)    ROCKY-6 Score : 4.17 (11/17/20 1321)     PFDI-20 Score: 51.05 (11/17/20 1321)           Goals:  Short term goals  Time Frame for Short term goals: Patient will in 4-5 sessions:  Short term goal 1: Report using 1-2 behavioral modification strategies to reduce report of urinary frequency and urgency through dietary and mechanical changes, with focus on full emptying of bladder and strengthening/improving endurance of PF muscles. - met - 10/15/2020  Short term goal 2: Improve score of quality of life index measure, PFDI-20, to 55 (79.16 upon initial eval) or less disability index demonstrating improved quality of life with less life interruptions due to urinary frequency and urgency, allowing patient to fully participate in social activities, including spending time with her spouse/friends. - met - 11/17/2020 - PFDI-20 : 51.05  Short term goal 3: Perform HEP for pelvic floor and core muscle groups with minimal direction from therapist as she progresses to become more independent managing her urinary frequency and urgency. - met - 11/17/2020  Short term goal 4: Rate severity of the problem related to urinary frequency and urgency, to less than 3-4 on scale of 0-10 with 0 being no problem and 10 being the worst - (5/10 reported at intial eval- does not allow urinary urgency and frequency disturb her social life - just wears moderate pad in ancitipation of issues when out of her home). - met - 11/17/2020 - reports severity of problem at 2/10  Short term goal 5: Report urinary frequency decrease to no more than 2 times per night on average. - met - 10/15/2020  Long term goals  Time Frame for Long term goals : Patient will in 8-10 sessions:  Long term goal 1: Report using 3-4 behavioral modification strategies to reduce report of urinary frequency and urgency through dietary and mechanical changes, with focus on full emptying of bladder and strengthening/improving endurance of PF muscles.   Long term goal 2: Improve score of quality of life index measure, PFDI-20, to 40 (79.16 upon initial eval) or less disability index demonstrating improved quality of life with less life interruptions due to urinary frequency and urgency, allowing patient to fully participate in social activities, including spending time with her spouse/friends. Long term goal 3: Perform HEP for pelvic floor and core muscle groups independently as she progresses to self-manage her urinary frequency and urgency. Long term goal 4: Rate severity of the problem related to urinary frequency and urgency, to less than 2-3 on scale of 0-10 with 0 being no problem and 10 being the worst - (5/10 reported at intial eval- does not allow urinary urgency and frequency disturb her social life - just wears moderate pad in ancitipation of issues when out of her home). Long term goal 5: Report urinary frequency decrease to no more than 1-2 times per night on average. Patient Goals   Patient goals : to be able to sleep better and not have to get up so many times at night - generally up to 3 times/night - \"makes me tired from lack of sleep. \"    Plan:    Plan  Specific instructions for Next Treatment: review pelvic floor ex coordinating with diaphragmatic breathing, progress to advanced positions as able; assess behavior modification strategies to promote healthy bowel/bladder habits  Current Treatment Recommendations: Strengthening, Functional Mobility Training, Neuromuscular Re-education, Endurance Training, Manual Therapy - Soft Tissue Mobilization, Home Exercise Program, Safety Education & Training, Patient/Caregiver Education & Training, Positioning  Plan Comment: Paient showing improvement and wishes to maintain time between visits to ~3-4 weeks prior to next follow. Next scheduled appointment on Tuesday, 12/15/2020 at 11:15AM - prefers late AM or PM due to Chron's.   Timed Code Treatment Minutes: 70 Minutes     Therapy Time   Individual Concurrent Group Co-treatment   Time In 0805         Time Out 1330         Minutes 70         Timed Code Treatment Minutes: [facility] [address], [address]

## 2020-12-15 ENCOUNTER — HOSPITAL ENCOUNTER (OUTPATIENT)
Dept: PHYSICAL THERAPY | Age: 80
Setting detail: THERAPIES SERIES
Discharge: HOME | End: 2020-12-15
Payer: MEDICARE

## 2020-12-15 PROCEDURE — 97530 THERAPEUTIC ACTIVITIES: CPT | Performed by: PHYSICAL THERAPIST

## 2020-12-15 PROCEDURE — 97110 THERAPEUTIC EXERCISES: CPT | Performed by: PHYSICAL THERAPIST

## 2020-12-15 NOTE — PROGRESS NOTES
Physical Therapy  Women's Health - Pelvic Floor      Daily Treatment Note and   Discharge Summary  Date: 12/15/2020  Patient Name: Hyun Cash  MRN: [id number]     :   1940    Subjective:   General  Additional Pertinent Hx: PMH: low back pain, SI/tailbone pain, vision and hearing deficits, osteoporosis, arthritis, bone fracture, IBS, Chron's disease, vaginal childbirth x 4 with episiotomies, prolapse with surgery to correct, hysterectomy  Referring Practitioner: Cee Caldwell MD  PT Visit Information  Onset Date: 09/15/17  PT Insurance Information: Medicare/ [employer]  Total # of Visits to Date: 6  Subjective  Subjective: Patient reports that the medications she is taking are aggravating her bowels. Feels \"jittery\" with various medications - primarily the steroids. Sister who just had TKR was staying with patient until yesterday. Predisone is helping to decrease leaking of bowel movement, but did have issue with leaking BM this morning - first time in last month. Urine leakage \"varies\" - earlier as frequently as couple times/day, currently \"rarel\" - about once a week. Overall, \"much better. \"  After some discussion, patient feels that she would like to discontinue formal PF PT after this session and continue with exercises to manage PF issues on her own.           Treatment Activities:                                      Exercises  Exercise 1: Diaphragmatic breathing - in isolation and then coordinating with pelvic floor muscle activities  Exercise 2: Pelvic floor contraction/elevation - supine - HOLD - performed simultaneously with posterior pelvic tilts  Exercise 3: Pelvic floor contraction/elevation - supine - transvaginal manual feedback - QUICK FLICKS x 15 reps Exercise 4: Pelvic floor contraction/elevation - seated - self-monitoring - HOLD - 9-12 second hold - 10 reps - more automatic with diaphragmatic breathing, occasional cues to avoid holding breath, cues to fully relax and take 2-3 normal breaths inbetween reps - less emphasis on diaphragmatic breathing since at times more confusing for patient  Exercise 5: Pelvic floor contraction/elevation - seated - self-monitoring - QUICK FLICKS - 17 reps  -  more automatic with less need for tactile cues on stomach  Exercise 6: Core strengtheing - supine/hooklying - posterior pelvic tilts - hold 5- 10 seconds x 10 reps  Exercise 7: Core strengtheing - supine/hooklying - posterior pelvic tilts with alternating shoulder flex - hold 5 seconds x 10 reps each UE - more automatic to contract abdomen prior to moving extremities  Exercise 8: Core strengtheing - supine/hooklying - posterior pelvic tilts with alternating hip flex - hold 5 seconds x 10 reps each UE - more automatic to contract abdomen prior to moving  Exercise 9: Pelvic floor contraction/elevation - standing - self-monitoring - HOLD - up to 10 second hold - 10 reps - more automatic to coordinate with pelvic floor muscles with diaphragmatic breathing, cues to take couple normal breaths between reps to allow relaxation of PF  Exercise 10: Pelvic floor contraction/elevation - standing - self-monitoring - QUICK FLICKS - 10 reps  -  improved with tactile cues on stomach  Exercise 11: Core strengtheing - supine/hooklying - posterior pelvic tilts with alternating opposite shoulder/hip flex - hold 2-3 seconds x 10 reps each UE - impaired coordination intermittently and requires time to regroup - more automatic to contract abdomen prior to moving extremities, cues to decrease amplitude of movment and focus on core stabilization  Other Activities  Other Activities: Other (see comment) Comment: Reviewed HEP with patient to ensure she did not have any specific concerns or questions. Reviewed need for core stabilization/contraction of TA in preparation for all activities - both with exercises and with functional activities. Patient does not have an exercise ball at home, so will just continue core stabilization exercises while supine/hooklying, informed may advance to seated in a chair for core stabilization exercises. Assessment:   Conditions Requiring Skilled Therapeutic Intervention  Body structures, Functions, Activity limitations: Decreased functional mobility ; Decreased endurance;Decreased strength;Decreased coordination(in regards to PF muscles) Assessment: Patient has met all short term goals and all long term goals. Overall, patient reports less urine and bowel leakage with improve control of both using PF muscles contractions. She continues to have medication adjustments regarding treatments for Chron's disease, and feels that this is contributing to any variances to her bowel control. Overall, less urinary incontinence, frequency, and urgency, with reportedly improved bladder control. Patient reports urinary incontinence about once a week - mostly when she doesn't urinate frequently enough. Reviewed need to urinate every 2-3 hours to prevent bladder from becoming excessively full. Patient was able to review core strengthening exercises and reviewed PF muscle control activities in all positions. Continues to need intermittent cues for breathing techniques during PF muscle control activities, mostly due to impaired coordination, so allowed patient to perform some reps with less attention to breathing patterns which seemed to improve performance. Discontinue PF PT at this time. Patient to continue with PF muscle control activities as well as core stabilization activities and follow up with physicians in regards to medications to best manage her Chron's.     Treatment Diagnosis: impaired muscle control and coordination of pelvic floor muscles and core muscles contributing to urinary frequency and urgency  Prognosis: Good  Decision Making: Medium Complexity  History: PMH: low back pain, SI/tailbone pain, vision and hearing deficits, osteoporosis, arthritis, bone fracture, IBS, Chron's disease, vaginal childbirth x 4 with episiotomies, prolapse with surgery to correct, hysterectomy  Exam: impaired muscle control and coordination of pelvic floor muscles and core muscles contributing to urinary frequency and urgency, see above  REQUIRES PT FOLLOW UP: Yes Long term goal 1: Report using 3-4 behavioral modification strategies to reduce report of urinary frequency and urgency through dietary and mechanical changes, with focus on full emptying of bladder and strengthening/improving endurance of PF muscles. - met - 12/15/2020  Long term goal 2: Improve score of quality of life index measure, PFDI-20, to 40 (79.16 upon initial eval) or less disability index demonstrating improved quality of life with less life interruptions due to urinary frequency and urgency, allowing patient to fully participate in social activities, including spending time with her spouse/friends. Long term goal 3: Perform HEP for pelvic floor and core muscle groups independently as she progresses to self-manage her urinary frequency and urgency. - met - 12/15/2020  Long term goal 4: Rate severity of the problem related to urinary frequency and urgency, to less than 2-3 on scale of 0-10 with 0 being no problem and 10 being the worst - (5/10 reported at intial eval- does not allow urinary urgency and frequency disturb her social life - just wears moderate pad in ancitipation of issues when out of her home). - met - 12/15/2020 - rated at 1/10  Long term goal 5: Report urinary frequency decrease to no more than 1-2 times per night on average. - met - 12/15/2020 - \"usually about twice a night\"  Patient Goals   Patient goals : to be able to sleep better and not have to get up so many times at night - generally up to 3 times/night - \"makes me tired from lack of sleep. \"    Plan:    Plan  Plan Comment: Discontinue PF PT at this time. Patient to continue with PF muscle control activities as well as core stabilization activities and follow up with physicians in regards to medications to best manage her Chron's.   Timed Code Treatment Minutes: 55 Minutes     Therapy Time   Individual Concurrent Group Co-treatment   Time In 1120         Time Out 1215         Minutes 54

## 2021-06-02 ENCOUNTER — CLINICAL DOCUMENTATION (OUTPATIENT)
Dept: OTHER | Age: 81
End: 2021-06-02

## 2021-08-18 ENCOUNTER — HOSPITAL ENCOUNTER (OUTPATIENT)
Dept: WOMENS IMAGING | Age: 81
Discharge: HOME | End: 2021-08-18
Payer: MEDICARE

## 2021-08-18 DIAGNOSIS — Z12.31 VISIT FOR SCREENING MAMMOGRAM: ICD-10-CM

## 2021-08-18 PROCEDURE — 77063 BREAST TOMOSYNTHESIS BI: CPT

## 2021-11-02 NOTE — PROGRESS NOTES
Physical Therapy  Women's Health - Pelvic Floor      Daily Treatment Note  Date: 10/15/2020  Patient Name: Ottoniel Wong  MRN: [id number]     :   1940    Subjective:   General  Additional Pertinent Hx: PMH: low back pain, SI/tailbone pain, vision and hearing deficits, osteoporosis, arthritis, bone fracture, IBS, Chron's disease, vaginal childbirth x 4 with episiotomies, prolapse with surgery to correct, hysterectomy  Referring Practitioner: Porter Joel MD  PT Visit Information  Onset Date: 09/15/17  PT Insurance Information: Medicare/ [employer]  Total # of Visits to Date: 3  Subjective  Subjective: Patient reports continued flares of Chron's, especially with eating bread/gluten - trying to eat gluten-free food. Patient reports slight improvements with urinating at night about 2 times/night. Reports that her PF exercises are going \"fine\" - \"not as much as I would like to\" - tries to do them at least once a day. Adjusted evening medications to earlier to avoid/limiting fluids closer to bed time. Able to use quick flick to delay urinary urge when driving.           Treatment Activities:                                      Exercises  Exercise 1: Diaphragmatic breathing - in isolation and then coordinating with pelvic floor muscle activities  Exercise 2: Pelvic floor contraction/elevation - supine - transvaginal manual feedback - HOLD - power of 2/5 - variable, about 6-7 second hold - 10 reps - verbal and tactile cues to coordinate with pelvic floor muscles with diaphragmatic breathing, improved with tactile cues on stomach with GENTLE upward and inward motion with hands to facilitate - PT demonstrated proper use of hands for facilitation, encouraged less use of accessory muscles  Exercise 3: Pelvic floor contraction/elevation - supine - transvaginal manual feedback - QUICK FLICKS - power of 2/5, 15 reps x 2 - sluggish - more confused when attempted to coordinate with breathing activities  Exercise 4: Pelvic floor contraction/elevation - seated - self-monitoring - HOLD - up to 2-3 second hold - 10 reps - verbal and tactile cues to coordinate with pelvic floor muscles with diaphragmatic breathing, cues to fully relax and take 2-3 normal breaths inbetween reps, improved with tactile cues on stomach with GENTLE upward and inward motion with hands to facilitate  Exercise 5: Pelvic floor contraction/elevation - seated - self-monitoring - QUICK FLICKS - 10 reps  - sluggish, improved with tactile cues on stomach  Exercise 6: Core strengtheing - supine/hooklying - posterior pelvic tilts - hold 5- 10 seconds x 10 reps  Exercise 7: Core strengtheing - supine/hooklying - posterior pelvic tilts with alternating shoulder flex - hold 5 seconds x 10 reps each UE - verbal and tactile cues to contract abdomen prior to moving extremities  Exercise 8: Core strengtheing - supine/hooklying - posterior pelvic tilts with alternating hip flex - hold 5 seconds x 10 reps each UE - verbal and tactile cues to contract abdomen prior to moving extremities  Exercise 9: Pelvic floor contraction/elevation - standing - self-monitoring - HOLD - up to 2 second hold - 10 reps - verbal and tactile cues to coordinate with pelvic floor muscles with diaphragmatic breathing, cues to fully relax and take 2-3 normal breaths inbetween reps, improved with tactile cues on stomach with GENTLE upward and inward motion with hands to facilitate  Exercise 10: Pelvic floor contraction/elevation - standing - self-monitoring - QUICK FLICKS - 10 reps  - sluggish, improved with tactile cues on stomach                        Education  Patient consented to transvaginal PF muscle assessment and treatment. Patient reported abilitity to use quick flick contractions of PF to delay urinary urgency. Assessment:   Conditions Requiring Skilled Therapeutic Intervention  Body structures, Functions, Activity limitations: Decreased functional mobility ; Decreased endurance;Decreased strength;Decreased coordination(in regards to PF muscles)  Assessment: Patient has met 2/5 short term goals after 3 PF PT sessions. Patient reports continued slight improvement in urinary complaints, primarily urgency and frequency at night - reporting urinating generally twice each night, allowing more restful night's sleep. Patient verbally consented to transvaginal pelvic muscle floor muscle assessment/feedback which continues to reveal weakness/impaired muscle control and coordination. With minimal coaching, tactile cues on abdomen and coordinating pelvic floor contractions with exhalation during diaphragmatic breathing, patient was able to demonstrate up to 2/5 muscle contraction for up to 6-7 seconds (10 seconds is normal). Patient was able to advance core strengthening exercises and advance to standing PF muscle control activities. Patient would definitely continue to benefit from pelvic floor physical therapy for continued education on healthy bladder/bowel habits and behavioral modifications as well as to improve muscle control/ coordination and endurance of pelvic floor and core muscles, which should ultimately diminish report of urinary frequency, urgency, and incomplete emptying.      Treatment Diagnosis: impaired muscle control and coordination of pelvic floor muscles and core muscles contributing to urinary frequency and urgency  Prognosis: Good  Decision Making: Medium Complexity  History: PMH: low back pain, SI/tailbone pain, vision and hearing deficits, osteoporosis, arthritis, bone fracture, IBS, Chron's disease, vaginal childbirth x 4 with episiotomies, prolapse with surgery to correct, hysterectomy  Exam: impaired muscle control and coordination of pelvic floor muscles and core muscles contributing to urinary frequency and urgency, see above  Clinical Presentation: evolving clinical symptoms, worse over last 3 years, but improved slightly with medications  REQUIRES PT FOLLOW UP: Yes  Treatment Initiated : 9/24/2020 -  initiated education about healthy bowel and bladder habits, initiated pelvic floor muscle control activities        Goals:  Short term goals  Time Frame for Short term goals: Patient will in 4-5 sessions:  Short term goal 1: Report using 1-2 behavioral modification strategies to reduce report of urinary frequency and urgency through dietary and mechanical changes, with focus on full emptying of bladder and strengthening/improving endurance of PF muscles. - met - 10/15/2020  Short term goal 2: Improve score of quality of life index measure, PFDI-20, to 55 (79.16 upon initial eval) or less disability index demonstrating improved quality of life with less life interruptions due to urinary frequency and urgency, allowing patient to fully participate in social activities, including spending time with her spouse/friends. Short term goal 3: Perform HEP for pelvic floor and core muscle groups with minimal direction from therapist as she progresses to become more independent managing her urinary frequency and urgency. Short term goal 4: Rate severity of the problem related to urinary frequency and urgency, to less than 3-4 on scale of 0-10 with 0 being no problem and 10 being the worst - (5/10 reported at intial eval- does not allow urinary urgency and frequency disturb her social life - just wears moderate pad in ancitipation of issues when out of her home). Short term goal 5: Report urinary frequency decrease to no more than 2 times per night on average. - met - 10/15/2020  Long term goals  Time Frame for Long term goals : Patient will in 8-10 sessions:  Long term goal 1: Report using 3-4 behavioral modification strategies to reduce report of urinary frequency and urgency through dietary and mechanical changes, with focus on full emptying of bladder and strengthening/improving endurance of PF muscles.   Long term goal 2: Improve score of quality of life index measure, PFDI-20, to 40 (79.16 upon initial eval) or less disability index demonstrating improved quality of life with less life interruptions due to urinary frequency and urgency, allowing patient to fully participate in social activities, including spending time with her spouse/friends. Long term goal 3: Perform HEP for pelvic floor and core muscle groups independently as she progresses to self-manage her urinary frequency and urgency. Long term goal 4: Rate severity of the problem related to urinary frequency and urgency, to less than 2-3 on scale of 0-10 with 0 being no problem and 10 being the worst - (5/10 reported at intial eval- does not allow urinary urgency and frequency disturb her social life - just wears moderate pad in ancitipation of issues when out of her home). Long term goal 5: Report urinary frequency decrease to no more than 1-2 times per night on average. Patient Goals   Patient goals : to be able to sleep better and not have to get up so many times at night - generally up to 3 times/night - \"makes me tired from lack of sleep. \"    Plan:    Plan  Specific instructions for Next Treatment: review pelvic floor ex coordinating with diaphragmatic breathing, progress to advanced positions as able; assess behavior modification strategies to promote healthy bowel/bladder habits  Current Treatment Recommendations: Strengthening, Functional Mobility Training, Neuromuscular Re-education, Endurance Training, Manual Therapy - Soft Tissue Mobilization, Home Exercise Program, Safety Education & Training, Patient/Caregiver Education & Training, Positioning  Plan Comment: See patient every 1-2 weeks initially to ensure patient is performing pelvic floor muscle exercises correctly and then taper, determining frequency of treatments based on progress, for a total for 8-10 sessions. Next scheduled appointment on Tuesday, 10/27/2020 at 10:15AM - prefers late AM or PM due to Chron's.   Timed Code Treatment Minutes: 84 Minutes     Therapy Time   Individual Concurrent Group Co-treatment   Time In 7799         Time Out 1115         Minutes 60         Timed Code Treatment Minutes: [address] [facility], PT #[id number] No

## 2022-08-30 ENCOUNTER — HOSPITAL ENCOUNTER (OUTPATIENT)
Dept: WOMENS IMAGING | Age: 82
Discharge: HOME | End: 2022-08-30
Payer: MEDICARE

## 2022-08-30 DIAGNOSIS — Z12.31 VISIT FOR SCREENING MAMMOGRAM: ICD-10-CM

## 2022-08-30 PROCEDURE — 77063 BREAST TOMOSYNTHESIS BI: CPT

## 2022-09-12 DIAGNOSIS — K50.112 CROHN'S DISEASE OF COLON WITH INTESTINAL OBSTRUCTION (HCC): Primary | ICD-10-CM

## 2022-09-14 DIAGNOSIS — K50.112 CROHN'S DISEASE OF COLON WITH INTESTINAL OBSTRUCTION (HCC): ICD-10-CM

## 2022-09-14 LAB
A/G RATIO: 2 (ref 1.1–2.2)
ALBUMIN SERPL-MCNC: 4.3 G/DL (ref 3.4–5)
ALP BLD-CCNC: 108 U/L (ref 40–129)
ALT SERPL-CCNC: 20 U/L (ref 10–40)
ANION GAP SERPL CALCULATED.3IONS-SCNC: 12 MMOL/L (ref 3–16)
AST SERPL-CCNC: 20 U/L (ref 15–37)
BASOPHILS ABSOLUTE: 0 K/UL (ref 0–0.2)
BASOPHILS RELATIVE PERCENT: 1 %
BILIRUB SERPL-MCNC: <0.2 MG/DL (ref 0–1)
BUN BLDV-MCNC: 24 MG/DL (ref 7–20)
C-REACTIVE PROTEIN: <3 MG/L (ref 0–5.1)
CALCIUM SERPL-MCNC: 9.4 MG/DL (ref 8.3–10.6)
CHLORIDE BLD-SCNC: 103 MMOL/L (ref 99–110)
CO2: 25 MMOL/L (ref 21–32)
CREAT SERPL-MCNC: 0.8 MG/DL (ref 0.6–1.2)
EOSINOPHILS ABSOLUTE: 0.1 K/UL (ref 0–0.6)
EOSINOPHILS RELATIVE PERCENT: 2.2 %
GFR AFRICAN AMERICAN: >60
GFR NON-AFRICAN AMERICAN: >60
GLUCOSE BLD-MCNC: 100 MG/DL (ref 70–99)
HCT VFR BLD CALC: 31.2 % (ref 36–48)
HEMOGLOBIN: 10.3 G/DL (ref 12–16)
LYMPHOCYTES ABSOLUTE: 0.9 K/UL (ref 1–5.1)
LYMPHOCYTES RELATIVE PERCENT: 17.3 %
MCH RBC QN AUTO: 26.4 PG (ref 26–34)
MCHC RBC AUTO-ENTMCNC: 33 G/DL (ref 31–36)
MCV RBC AUTO: 79.8 FL (ref 80–100)
MONOCYTES ABSOLUTE: 0.6 K/UL (ref 0–1.3)
MONOCYTES RELATIVE PERCENT: 10.7 %
NEUTROPHILS ABSOLUTE: 3.6 K/UL (ref 1.7–7.7)
NEUTROPHILS RELATIVE PERCENT: 68.8 %
PDW BLD-RTO: 15.6 % (ref 12.4–15.4)
PLATELET # BLD: 360 K/UL (ref 135–450)
PMV BLD AUTO: 8.5 FL (ref 5–10.5)
POTASSIUM SERPL-SCNC: 4.4 MMOL/L (ref 3.5–5.1)
RBC # BLD: 3.9 M/UL (ref 4–5.2)
SODIUM BLD-SCNC: 140 MMOL/L (ref 136–145)
TOTAL PROTEIN: 6.4 G/DL (ref 6.4–8.2)
WBC # BLD: 5.2 K/UL (ref 4–11)

## 2022-09-20 DIAGNOSIS — K50.019 COMPLICATION DUE TO CROHN'S DISEASE OF SMALL INTESTINE (HCC): Primary | ICD-10-CM

## 2023-06-15 RX ORDER — LEVOTHYROXINE SODIUM 0.07 MG/1
75 TABLET ORAL DAILY
COMMUNITY

## 2023-06-21 ENCOUNTER — ANESTHESIA (OUTPATIENT)
Dept: ENDOSCOPY | Age: 83
End: 2023-06-21
Payer: MEDICARE

## 2023-06-21 ENCOUNTER — ANESTHESIA EVENT (OUTPATIENT)
Dept: ENDOSCOPY | Age: 83
End: 2023-06-21
Payer: MEDICARE

## 2023-06-21 ENCOUNTER — HOSPITAL ENCOUNTER (OUTPATIENT)
Age: 83
Setting detail: OUTPATIENT SURGERY
Discharge: HOME | End: 2023-06-21
Attending: INTERNAL MEDICINE | Admitting: INTERNAL MEDICINE
Payer: MEDICARE

## 2023-06-21 VITALS
WEIGHT: 125 LBS | OXYGEN SATURATION: 98 % | HEART RATE: 54 BPM | SYSTOLIC BLOOD PRESSURE: 170 MMHG | DIASTOLIC BLOOD PRESSURE: 83 MMHG | RESPIRATION RATE: 16 BRPM | BODY MASS INDEX: 24.54 KG/M2 | TEMPERATURE: 97.4 F | HEIGHT: 60 IN

## 2023-06-21 DIAGNOSIS — D50.9 IRON DEFICIENCY ANEMIA, UNSPECIFIED IRON DEFICIENCY ANEMIA TYPE: ICD-10-CM

## 2023-06-21 PROCEDURE — 88305 TISSUE EXAM BY PATHOLOGIST: CPT

## 2023-06-21 PROCEDURE — 7100000010 HC PHASE II RECOVERY - FIRST 15 MIN: Performed by: INTERNAL MEDICINE

## 2023-06-21 PROCEDURE — 6360000002 HC RX W HCPCS: Performed by: NURSE ANESTHETIST, CERTIFIED REGISTERED

## 2023-06-21 PROCEDURE — 3700000000 HC ANESTHESIA ATTENDED CARE: Performed by: INTERNAL MEDICINE

## 2023-06-21 PROCEDURE — 3700000001 HC ADD 15 MINUTES (ANESTHESIA): Performed by: INTERNAL MEDICINE

## 2023-06-21 PROCEDURE — 2580000003 HC RX 258: Performed by: ANESTHESIOLOGY

## 2023-06-21 PROCEDURE — 3609012400 HC EGD TRANSORAL BIOPSY SINGLE/MULTIPLE: Performed by: INTERNAL MEDICINE

## 2023-06-21 PROCEDURE — 2500000003 HC RX 250 WO HCPCS: Performed by: NURSE ANESTHETIST, CERTIFIED REGISTERED

## 2023-06-21 PROCEDURE — 2709999900 HC NON-CHARGEABLE SUPPLY: Performed by: INTERNAL MEDICINE

## 2023-06-21 PROCEDURE — 7100000011 HC PHASE II RECOVERY - ADDTL 15 MIN: Performed by: INTERNAL MEDICINE

## 2023-06-21 RX ORDER — SODIUM CHLORIDE, SODIUM LACTATE, POTASSIUM CHLORIDE, CALCIUM CHLORIDE 600; 310; 30; 20 MG/100ML; MG/100ML; MG/100ML; MG/100ML
INJECTION, SOLUTION INTRAVENOUS CONTINUOUS
Status: DISCONTINUED | OUTPATIENT
Start: 2023-06-21 | End: 2023-06-21 | Stop reason: HOSPADM

## 2023-06-21 RX ORDER — PROPOFOL 10 MG/ML
INJECTION, EMULSION INTRAVENOUS PRN
Status: DISCONTINUED | OUTPATIENT
Start: 2023-06-21 | End: 2023-06-21 | Stop reason: SDUPTHER

## 2023-06-21 RX ORDER — SODIUM CHLORIDE 0.9 % (FLUSH) 0.9 %
5-40 SYRINGE (ML) INJECTION EVERY 12 HOURS SCHEDULED
Status: DISCONTINUED | OUTPATIENT
Start: 2023-06-21 | End: 2023-06-21 | Stop reason: HOSPADM

## 2023-06-21 RX ORDER — SODIUM CHLORIDE 9 MG/ML
INJECTION, SOLUTION INTRAVENOUS PRN
Status: DISCONTINUED | OUTPATIENT
Start: 2023-06-21 | End: 2023-06-21 | Stop reason: HOSPADM

## 2023-06-21 RX ORDER — LIDOCAINE HYDROCHLORIDE 10 MG/ML
1 INJECTION, SOLUTION EPIDURAL; INFILTRATION; INTRACAUDAL; PERINEURAL
Status: DISCONTINUED | OUTPATIENT
Start: 2023-06-21 | End: 2023-06-21 | Stop reason: HOSPADM

## 2023-06-21 RX ORDER — LIDOCAINE HYDROCHLORIDE 20 MG/ML
INJECTION, SOLUTION INFILTRATION; PERINEURAL PRN
Status: DISCONTINUED | OUTPATIENT
Start: 2023-06-21 | End: 2023-06-21 | Stop reason: SDUPTHER

## 2023-06-21 RX ORDER — SODIUM CHLORIDE 0.9 % (FLUSH) 0.9 %
5-40 SYRINGE (ML) INJECTION PRN
Status: DISCONTINUED | OUTPATIENT
Start: 2023-06-21 | End: 2023-06-21 | Stop reason: HOSPADM

## 2023-06-21 RX ADMIN — PROPOFOL 10 MG: 10 INJECTION, EMULSION INTRAVENOUS at 10:21

## 2023-06-21 RX ADMIN — PROPOFOL 15 MG: 10 INJECTION, EMULSION INTRAVENOUS at 10:27

## 2023-06-21 RX ADMIN — PROPOFOL 10 MG: 10 INJECTION, EMULSION INTRAVENOUS at 10:23

## 2023-06-21 RX ADMIN — SODIUM CHLORIDE, POTASSIUM CHLORIDE, SODIUM LACTATE AND CALCIUM CHLORIDE: 600; 310; 30; 20 INJECTION, SOLUTION INTRAVENOUS at 09:53

## 2023-06-21 RX ADMIN — LIDOCAINE HYDROCHLORIDE 40 MG: 20 INJECTION, SOLUTION INFILTRATION; PERINEURAL at 10:19

## 2023-06-21 RX ADMIN — PROPOFOL 25 MG: 10 INJECTION, EMULSION INTRAVENOUS at 10:19

## 2023-06-21 RX ADMIN — PROPOFOL 10 MG: 10 INJECTION, EMULSION INTRAVENOUS at 10:24

## 2023-06-21 RX ADMIN — PROPOFOL 10 MG: 10 INJECTION, EMULSION INTRAVENOUS at 10:25

## 2023-06-21 RX ADMIN — PROPOFOL 10 MG: 10 INJECTION, EMULSION INTRAVENOUS at 10:26

## 2023-06-21 RX ADMIN — PROPOFOL 10 MG: 10 INJECTION, EMULSION INTRAVENOUS at 10:22

## 2023-06-21 ASSESSMENT — PAIN SCALES - GENERAL
PAINLEVEL_OUTOF10: 0

## 2023-06-21 ASSESSMENT — ENCOUNTER SYMPTOMS: SHORTNESS OF BREATH: 0

## 2023-06-21 ASSESSMENT — PAIN - FUNCTIONAL ASSESSMENT: PAIN_FUNCTIONAL_ASSESSMENT: 0-10

## 2023-06-21 NOTE — PROCEDURES
Endoscopy Note    Patient: Nacho Romero  : 1940      Procedure: Esophagogastroduodenoscopy with biopsies    Date:  2023     Surgeon:  Anna Maria MD     Referring Physician:  CHANA Graff Se, MD      History:      INDICATION: Iron deficiency anemia history of small bowel Crohn's disease on Humira     ASA: 3  SEDATION: MAC         Operative Surgeon: Anna Maria MD  Scope Type: Gastroscope      Preoperative Diagnosis: Iron deficiency anemia  Postoperative Diagnosis: GE junction nodule    Procedure Performed: EGD    Procedure Details:    With the patient in left lateral position the endoscope was passed through the hypopharynx into the esophagus. The scope as then passed through the esophagus to the second portion of the duodenum. All visualized portions were carefully inspected. The gastric air was suctioned and the scope as removed. Patient tolerated the procedure well. Findings and maneuvers are discussed below. Complications:  None  Estimated Blood Loss: minimal to none    Post Operative Findings:   Esophagus: Normal  Stomach: Small hiatal hernia just distal to the GE junction there was a 1 cm nodule that seemed firm on biopsies. Broad-based stock portion of the lesion seem to be submucosal.  Difficult to biopsy and retroflexion needed to be biopsied with deflation of the stomach on forward-viewing. Biopsies were taken of the antrum to rule out H. pylori with mild gastritis. Duodenum: Normal biopsies were taken rule out celiac disease    Plan: Follow-up biopsies. Procedure today was more for iron deficiency anemia. She has intervally developed some diarrhea. We will discuss with the family whether to proceed with colonoscopy or MRI enterography. Signed By: MD Anna Romeo MD,   [facility]  [phone]    Please note that some or all of this record was generated using voice recognition software.  If there are any questions about the content

## 2023-06-21 NOTE — ANESTHESIA POSTPROCEDURE EVALUATION
Department of Anesthesiology  Postprocedure Note    Patient: Daniel Bowie  MRN: [id number]  YOB: 1940  Date of evaluation: 6/21/2023      Procedure Summary     Date: 06/21/23 Room / Location: [facility] / [facility]    Anesthesia Start: 5308 Anesthesia Stop: 3753    Procedure: EGD BIOPSY Diagnosis:       Iron deficiency anemia, unspecified iron deficiency anemia type      (IRON DEFICIENCY ANEMIA)    Surgeons: Isac Ritter MD Responsible Provider: Karen Landers MD    Anesthesia Type: general ASA Status: 2          Anesthesia Type: No value filed.     America Phase I: America Score: 10    America Phase II: America Score: 7      Anesthesia Post Evaluation    Patient location during evaluation: PACU  Patient participation: complete - patient participated  Level of consciousness: awake and alert  Airway patency: patent  Nausea & Vomiting: no nausea and no vomiting  Complications: no  Cardiovascular status: hemodynamically stable  Respiratory status: acceptable, room air and spontaneous ventilation  Hydration status: stable  Comments: --------------------            06/21/23               1033     --------------------   BP:     (!) 132/58   Pulse:      55       Resp:       16       Temp:                SpO2:      100%     --------------------

## 2023-06-21 NOTE — PROGRESS NOTES
Received in PACU. Report received from endo nurse and CRNA. Drowsy but arouses to verbal stimuli. Offers no complaints.

## 2023-06-21 NOTE — PROGRESS NOTES
Awake and alert with no complaints. Vs stable. Dr Dale Tejeda here to talk to patient and family. Discharge instructions reviewed with patient/responsible adult and understanding verbalized. Discharge instructions signed and copies given. Discharge criteria met per hospital policy.

## 2023-06-21 NOTE — DISCHARGE INSTRUCTIONS
sign in to your [employer] account. Enter P068 in the [employer] box to learn more about \"Upper GI Endoscopy: What to Expect at Home. \"     If you do not have an account, please click on the \"Sign Up Now\" link. Current as of: May 12, 2017  Content Version: 11.6  © 7599-7196 [employer]. Care instructions adapted under license by [facility] ([facility]). If you have questions about a medical condition or this instruction, always ask your healthcare professional. [address] any warranty or liability for your use of this information.

## 2023-06-21 NOTE — ANESTHESIA PRE PROCEDURE
Department of Anesthesiology  Preprocedure Note       Name:  Araceli Juarez   Age:  79 y.o.  :  1940                                          MRN:  [id number]         Date:  2023      Surgeon: Any Espinosa):  Yael Amaral MD    Procedure: Procedure(s):  ESOPHAGOGASTRODUODENOSCOPY    Medications prior to admission:   Prior to Admission medications    Medication Sig Start Date End Date Taking?  Authorizing Provider   levothyroxine (SYNTHROID) 75 MCG tablet Take 1 tablet by mouth Daily   Yes Historical Provider, MD   Multiple Vitamin (MULTIVITAMIN PO) Take by mouth daily   Yes Historical Provider, MD   FERROUS GLUCONATE PO Take by mouth daily   Yes Historical Provider, MD   Cyanocobalamin (VITAMIN B-12 PO) Take by mouth daily   Yes Historical Provider, MD   BIOTIN PO Take by mouth daily   Yes Historical Provider, MD   dicyclomine (BENTYL) 10 MG capsule Take 1 capsule by mouth as needed    Historical Provider, MD   lovastatin (MEVACOR) 10 MG tablet Take 1 tablet by mouth nightly    Historical Provider, MD   estrogens, conjugated, (PREMARIN) 0.3 MG tablet Take 1 tablet by mouth daily Indications: takes 1/2 tab    Historical Provider, MD   fluticasone (FLONASE) 50 MCG/ACT nasal spray 1 spray by Nasal route daily    Historical Provider, MD   Loratadine 10 MG CAPS Take by mouth daily    Historical Provider, MD   Probiotic Product (ALIGN PO) Take by mouth daily    Historical Provider, MD   omeprazole (PRILOSEC) 20 MG delayed release capsule Take 1 capsule by mouth daily    Historical Provider, MD   OMEGA-3 KRILL OIL PO Take 350 mg by mouth daily    Historical Provider, MD   Coenzyme Q10 (CO Q 10 PO) Take by mouth daily    Historical Provider, MD   celecoxib (CELEBREX) 200 MG capsule Take 1 capsule by mouth 2 times daily    Historical Provider, MD   Cholecalciferol (VITAMIN D) 2000 UNITS CAPS capsule Take by mouth daily    Historical Provider, MD   Psyllium (METAMUCIL PO) Take by mouth daily    Historical

## 2023-06-21 NOTE — H&P
[address]   Pre-operative History and Physical    Patient: Kate Haas  : 1940  Acct#:     HISTORY OF PRESENT ILLNESS:    The patient is a 79 y.o. female who presents for history of small bowel Crohn's disease status post prior resection. She is on Humira currently. Worsening iron deficiency anemia EGD to assess    Indications: Iron deficiency anemia    Past Medical History:        Diagnosis Date    Hyperlipidemia     Hypertension       Past Surgical History:        Procedure Laterality Date    APPENDECTOMY      COLONOSCOPY  2016    Dr. Heather Khoury  2018    Flex. Sig. Anthony Gross [facility]    FOOT SURGERY      HYSTERECTOMY (CERVIX STATUS UNKNOWN)      SHOULDER ARTHROSCOPY Right       Medications Prior to Admission:   No current facility-administered medications on file prior to encounter.      Current Outpatient Medications on File Prior to Encounter   Medication Sig Dispense Refill    levothyroxine (SYNTHROID) 75 MCG tablet Take 1 tablet by mouth Daily      Multiple Vitamin (MULTIVITAMIN PO) Take by mouth daily      FERROUS GLUCONATE PO Take by mouth daily      Cyanocobalamin (VITAMIN B-12 PO) Take by mouth daily      BIOTIN PO Take by mouth daily      dicyclomine (BENTYL) 10 MG capsule Take 1 capsule by mouth as needed      lovastatin (MEVACOR) 10 MG tablet Take 1 tablet by mouth nightly      estrogens, conjugated, (PREMARIN) 0.3 MG tablet Take 1 tablet by mouth daily Indications: takes 1/2 tab      fluticasone (FLONASE) 50 MCG/ACT nasal spray 1 spray by Nasal route daily      Loratadine 10 MG CAPS Take by mouth daily      Probiotic Product (ALIGN PO) Take by mouth daily      omeprazole (PRILOSEC) 20 MG delayed release capsule Take 1 capsule by mouth daily      OMEGA-3 KRILL OIL PO Take 350 mg by mouth daily      Coenzyme Q10 (CO Q 10 PO) Take by mouth daily      celecoxib (CELEBREX) 200 MG capsule Take 1 capsule by mouth 2 times daily      Cholecalciferol (VITAMIN D)

## 2023-10-07 ENCOUNTER — APPOINTMENT (OUTPATIENT)
Dept: CT IMAGING | Age: 83
DRG: 387 | End: 2023-10-07
Payer: MEDICARE

## 2023-10-07 ENCOUNTER — HOSPITAL ENCOUNTER (INPATIENT)
Age: 83
LOS: 4 days | Discharge: HOME | DRG: 387 | End: 2023-10-11
Attending: EMERGENCY MEDICINE | Admitting: FAMILY MEDICINE
Payer: MEDICARE

## 2023-10-07 DIAGNOSIS — K56.600 PARTIAL SMALL BOWEL OBSTRUCTION (HCC): Primary | ICD-10-CM

## 2023-10-07 PROBLEM — K56.609 SBO (SMALL BOWEL OBSTRUCTION) (HCC): Status: ACTIVE | Noted: 2023-10-07

## 2023-10-07 LAB
ALBUMIN SERPL-MCNC: 4.6 G/DL (ref 3.4–5)
ALBUMIN/GLOB SERPL: 1.8 {RATIO} (ref 1.1–2.2)
ALP SERPL-CCNC: 96 U/L (ref 40–129)
ALT SERPL-CCNC: 21 U/L (ref 10–40)
ANION GAP SERPL CALCULATED.3IONS-SCNC: 12 MMOL/L (ref 3–16)
AST SERPL-CCNC: 20 U/L (ref 15–37)
BACTERIA URNS QL MICRO: NORMAL /HPF
BASOPHILS # BLD: 0.1 K/UL (ref 0–0.2)
BASOPHILS NFR BLD: 1 %
BILIRUB SERPL-MCNC: 0.4 MG/DL (ref 0–1)
BILIRUB UR QL STRIP.AUTO: NEGATIVE
BUN SERPL-MCNC: 23 MG/DL (ref 7–20)
CALCIUM SERPL-MCNC: 8.8 MG/DL (ref 8.3–10.6)
CHLORIDE SERPL-SCNC: 103 MMOL/L (ref 99–110)
CLARITY UR: CLEAR
CO2 SERPL-SCNC: 23 MMOL/L (ref 21–32)
COLOR UR: YELLOW
CREAT SERPL-MCNC: 1 MG/DL (ref 0.6–1.2)
DEPRECATED RDW RBC AUTO: 15.3 % (ref 12.4–15.4)
EOSINOPHIL # BLD: 0.1 K/UL (ref 0–0.6)
EOSINOPHIL NFR BLD: 1.2 %
EPI CELLS #/AREA URNS AUTO: 1 /HPF (ref 0–5)
GFR SERPLBLD CREATININE-BSD FMLA CKD-EPI: 56 ML/MIN/{1.73_M2}
GLUCOSE SERPL-MCNC: 163 MG/DL (ref 70–99)
GLUCOSE UR STRIP.AUTO-MCNC: NEGATIVE MG/DL
HCT VFR BLD AUTO: 38.1 % (ref 36–48)
HGB BLD-MCNC: 13.1 G/DL (ref 12–16)
HGB UR QL STRIP.AUTO: NEGATIVE
HYALINE CASTS #/AREA URNS AUTO: 0 /LPF (ref 0–8)
KETONES UR STRIP.AUTO-MCNC: NEGATIVE MG/DL
LEUKOCYTE ESTERASE UR QL STRIP.AUTO: ABNORMAL
LIPASE SERPL-CCNC: 35 U/L (ref 13–60)
LYMPHOCYTES # BLD: 0.9 K/UL (ref 1–5.1)
LYMPHOCYTES NFR BLD: 12.4 %
MCH RBC QN AUTO: 29.6 PG (ref 26–34)
MCHC RBC AUTO-ENTMCNC: 34.4 G/DL (ref 31–36)
MCV RBC AUTO: 86.1 FL (ref 80–100)
MONOCYTES # BLD: 0.4 K/UL (ref 0–1.3)
MONOCYTES NFR BLD: 5.7 %
NEUTROPHILS # BLD: 6 K/UL (ref 1.7–7.7)
NEUTROPHILS NFR BLD: 79.7 %
NITRITE UR QL STRIP.AUTO: NEGATIVE
PH UR STRIP.AUTO: 8 [PH] (ref 5–8)
PLATELET # BLD AUTO: 308 K/UL (ref 135–450)
PMV BLD AUTO: 8.2 FL (ref 5–10.5)
POTASSIUM SERPL-SCNC: 4 MMOL/L (ref 3.5–5.1)
PROT SERPL-MCNC: 7.1 G/DL (ref 6.4–8.2)
PROT UR STRIP.AUTO-MCNC: NEGATIVE MG/DL
RBC # BLD AUTO: 4.42 M/UL (ref 4–5.2)
RBC CLUMPS #/AREA URNS AUTO: 0 /HPF (ref 0–4)
SODIUM SERPL-SCNC: 138 MMOL/L (ref 136–145)
SP GR UR STRIP.AUTO: 1.02 (ref 1–1.03)
UA COMPLETE W REFLEX CULTURE PNL UR: ABNORMAL
UA DIPSTICK W REFLEX MICRO PNL UR: YES
URN SPEC COLLECT METH UR: ABNORMAL
UROBILINOGEN UR STRIP-ACNC: 0.2 E.U./DL
WBC # BLD AUTO: 7.6 K/UL (ref 4–11)
WBC #/AREA URNS AUTO: 2 /HPF (ref 0–5)

## 2023-10-07 PROCEDURE — 6360000002 HC RX W HCPCS: Performed by: FAMILY MEDICINE

## 2023-10-07 PROCEDURE — 96374 THER/PROPH/DIAG INJ IV PUSH: CPT

## 2023-10-07 PROCEDURE — 99222 1ST HOSP IP/OBS MODERATE 55: CPT | Performed by: STUDENT IN AN ORGANIZED HEALTH CARE EDUCATION/TRAINING PROGRAM

## 2023-10-07 PROCEDURE — 6370000000 HC RX 637 (ALT 250 FOR IP): Performed by: FAMILY MEDICINE

## 2023-10-07 PROCEDURE — 2580000003 HC RX 258: Performed by: PHYSICIAN ASSISTANT

## 2023-10-07 PROCEDURE — 74177 CT ABD & PELVIS W/CONTRAST: CPT

## 2023-10-07 PROCEDURE — 85025 COMPLETE CBC W/AUTO DIFF WBC: CPT

## 2023-10-07 PROCEDURE — 1200000000 HC SEMI PRIVATE

## 2023-10-07 PROCEDURE — 6360000002 HC RX W HCPCS: Performed by: PHYSICIAN ASSISTANT

## 2023-10-07 PROCEDURE — 6370000000 HC RX 637 (ALT 250 FOR IP): Performed by: PHYSICIAN ASSISTANT

## 2023-10-07 PROCEDURE — 2580000003 HC RX 258: Performed by: FAMILY MEDICINE

## 2023-10-07 PROCEDURE — 99285 EMERGENCY DEPT VISIT HI MDM: CPT

## 2023-10-07 PROCEDURE — 80053 COMPREHEN METABOLIC PANEL: CPT

## 2023-10-07 PROCEDURE — 6360000004 HC RX CONTRAST MEDICATION: Performed by: PHYSICIAN ASSISTANT

## 2023-10-07 PROCEDURE — 81001 URINALYSIS AUTO W/SCOPE: CPT

## 2023-10-07 PROCEDURE — 96375 TX/PRO/DX INJ NEW DRUG ADDON: CPT

## 2023-10-07 PROCEDURE — 83690 ASSAY OF LIPASE: CPT

## 2023-10-07 RX ORDER — MORPHINE SULFATE 2 MG/ML
2 INJECTION, SOLUTION INTRAMUSCULAR; INTRAVENOUS ONCE
Status: COMPLETED | OUTPATIENT
Start: 2023-10-07 | End: 2023-10-07

## 2023-10-07 RX ORDER — LOSARTAN POTASSIUM 25 MG/1
25 TABLET ORAL DAILY
COMMUNITY

## 2023-10-07 RX ORDER — SODIUM CHLORIDE 9 MG/ML
INJECTION, SOLUTION INTRAVENOUS PRN
Status: DISCONTINUED | OUTPATIENT
Start: 2023-10-07 | End: 2023-10-11 | Stop reason: HOSPADM

## 2023-10-07 RX ORDER — ONDANSETRON 2 MG/ML
4 INJECTION INTRAMUSCULAR; INTRAVENOUS ONCE
Status: COMPLETED | OUTPATIENT
Start: 2023-10-07 | End: 2023-10-07

## 2023-10-07 RX ORDER — 0.9 % SODIUM CHLORIDE 0.9 %
1000 INTRAVENOUS SOLUTION INTRAVENOUS ONCE
Status: COMPLETED | OUTPATIENT
Start: 2023-10-07 | End: 2023-10-07

## 2023-10-07 RX ORDER — SODIUM CHLORIDE 9 MG/ML
INJECTION, SOLUTION INTRAVENOUS CONTINUOUS
Status: DISCONTINUED | OUTPATIENT
Start: 2023-10-07 | End: 2023-10-09

## 2023-10-07 RX ORDER — ACETAMINOPHEN 650 MG/1
650 SUPPOSITORY RECTAL EVERY 6 HOURS PRN
Status: DISCONTINUED | OUTPATIENT
Start: 2023-10-07 | End: 2023-10-11 | Stop reason: HOSPADM

## 2023-10-07 RX ORDER — ACETAMINOPHEN 325 MG/1
650 TABLET ORAL EVERY 6 HOURS PRN
Status: DISCONTINUED | OUTPATIENT
Start: 2023-10-07 | End: 2023-10-11 | Stop reason: HOSPADM

## 2023-10-07 RX ORDER — SODIUM CHLORIDE 0.9 % (FLUSH) 0.9 %
5-40 SYRINGE (ML) INJECTION PRN
Status: DISCONTINUED | OUTPATIENT
Start: 2023-10-07 | End: 2023-10-11 | Stop reason: HOSPADM

## 2023-10-07 RX ORDER — SODIUM CHLORIDE 0.9 % (FLUSH) 0.9 %
5-40 SYRINGE (ML) INJECTION EVERY 12 HOURS SCHEDULED
Status: DISCONTINUED | OUTPATIENT
Start: 2023-10-07 | End: 2023-10-11 | Stop reason: HOSPADM

## 2023-10-07 RX ORDER — LEVOTHYROXINE SODIUM 0.07 MG/1
75 TABLET ORAL DAILY
Status: DISCONTINUED | OUTPATIENT
Start: 2023-10-07 | End: 2023-10-11 | Stop reason: HOSPADM

## 2023-10-07 RX ORDER — ONDANSETRON 4 MG/1
4 TABLET, ORALLY DISINTEGRATING ORAL EVERY 8 HOURS PRN
Status: DISCONTINUED | OUTPATIENT
Start: 2023-10-07 | End: 2023-10-11 | Stop reason: HOSPADM

## 2023-10-07 RX ORDER — ONDANSETRON 2 MG/ML
4 INJECTION INTRAMUSCULAR; INTRAVENOUS EVERY 6 HOURS PRN
Status: DISCONTINUED | OUTPATIENT
Start: 2023-10-07 | End: 2023-10-11 | Stop reason: HOSPADM

## 2023-10-07 RX ORDER — POLYETHYLENE GLYCOL 3350 17 G/17G
17 POWDER, FOR SOLUTION ORAL DAILY PRN
Status: DISCONTINUED | OUTPATIENT
Start: 2023-10-07 | End: 2023-10-10

## 2023-10-07 RX ORDER — PREDNISONE 20 MG/1
40 TABLET ORAL ONCE
Status: COMPLETED | OUTPATIENT
Start: 2023-10-07 | End: 2023-10-07

## 2023-10-07 RX ORDER — ENOXAPARIN SODIUM 100 MG/ML
40 INJECTION SUBCUTANEOUS DAILY
Status: DISCONTINUED | OUTPATIENT
Start: 2023-10-07 | End: 2023-10-11 | Stop reason: HOSPADM

## 2023-10-07 RX ADMIN — MORPHINE SULFATE 2 MG: 2 INJECTION, SOLUTION INTRAMUSCULAR; INTRAVENOUS at 06:16

## 2023-10-07 RX ADMIN — ONDANSETRON 4 MG: 2 INJECTION INTRAMUSCULAR; INTRAVENOUS at 06:16

## 2023-10-07 RX ADMIN — ENOXAPARIN SODIUM 40 MG: 100 INJECTION SUBCUTANEOUS at 13:38

## 2023-10-07 RX ADMIN — SODIUM CHLORIDE 1000 ML: 9 INJECTION, SOLUTION INTRAVENOUS at 06:16

## 2023-10-07 RX ADMIN — IOPAMIDOL 75 ML: 755 INJECTION, SOLUTION INTRAVENOUS at 06:47

## 2023-10-07 RX ADMIN — PREDNISONE 40 MG: 20 TABLET ORAL at 06:16

## 2023-10-07 RX ADMIN — LEVOTHYROXINE SODIUM 75 MCG: 0.07 TABLET ORAL at 13:38

## 2023-10-07 RX ADMIN — ESTROGENS, CONJUGATED 0.3 MG: 0.3 TABLET, FILM COATED ORAL at 13:38

## 2023-10-07 RX ADMIN — SODIUM CHLORIDE: 9 INJECTION, SOLUTION INTRAVENOUS at 12:06

## 2023-10-07 ASSESSMENT — LIFESTYLE VARIABLES
HOW OFTEN DO YOU HAVE A DRINK CONTAINING ALCOHOL: NEVER
HOW MANY STANDARD DRINKS CONTAINING ALCOHOL DO YOU HAVE ON A TYPICAL DAY: PATIENT DOES NOT DRINK

## 2023-10-07 ASSESSMENT — PAIN SCALES - GENERAL
PAINLEVEL_OUTOF10: 10
PAINLEVEL_OUTOF10: 0
PAINLEVEL_OUTOF10: 9
PAINLEVEL_OUTOF10: 0
PAINLEVEL_OUTOF10: 3

## 2023-10-07 ASSESSMENT — PAIN - FUNCTIONAL ASSESSMENT: PAIN_FUNCTIONAL_ASSESSMENT: 0-10

## 2023-10-07 NOTE — H&P
capsule Take by mouth daily    ProviderYeni MD   Psyllium (METAMUCIL PO) Take by mouth daily    Yeni Szymanski MD   Zinc 30 MG CAPS Take by mouth daily    ProviderYeni MD       Physical Exam:    Physical Exam     General: NAD  Eyes: EOMI  ENT: neck supple  Cardiovascular: Regular rate. Respiratory: Clear to auscultation  Gastrointestinal: Soft, abdominal tenderness, present bowel sounds throughout  Genitourinary: no suprapubic tenderness  Musculoskeletal: No edema  Skin: warm, dry  Neuro: Alert. Psych: Mood appropriate. Past Medical History:   PMHx   Past Medical History:   Diagnosis Date    Hyperlipidemia     Hypertension      PSHX:  has a past surgical history that includes Foot surgery; Shoulder arthroscopy (Right); Hysterectomy; Appendectomy; Colonoscopy (11/01/2016); Colonoscopy (02/13/2018); and Upper gastrointestinal endoscopy (N/A, 6/21/2023). Allergies: Allergies   Allergen Reactions    Cephalosporins Rash    Nitrofuran Derivatives Rash     Fam HX:  family history includes Emphysema in her mother; No Known Problems in her father.   Soc HX:   Social History     Socioeconomic History    Marital status:    Tobacco Use    Smoking status: Never    Smokeless tobacco: Never   Vaping Use    Vaping Use: Never used   Substance and Sexual Activity    Alcohol use: Not Currently    Drug use: No       Medications:   Medications:    Infusions:   PRN Meds:     Labs      CBC:   Recent Labs     10/07/23  0605   WBC 7.6   HGB 13.1        BMP:    Recent Labs     10/07/23  0605      K 4.0      CO2 23   BUN 23*   CREATININE 1.0   GLUCOSE 163*     Hepatic:   Recent Labs     10/07/23  0605   AST 20   ALT 21   BILITOT 0.4   ALKPHOS 96     Lipids: No results found for: \"CHOL\", \"HDL\", \"TRIG\"  Hemoglobin A1C: No results found for: \"LABA1C\"  TSH: No results found for: \"TSH\"  Troponin: No results found for: \"TROPONINT\"  Lactic Acid: No results for input(s): \"LACTA\" in the last

## 2023-10-07 NOTE — ED NOTES
Two attempts were made to place a nasogastric tube through the patients right nostril but attempts were not successful. Tubing was not able to be swallowed by the patient after several attempts while swallowing water. Patient declined to have this RN make a third attempt.       Monica Parry RN  10/07/23 9184

## 2023-10-07 NOTE — ED PROVIDER NOTES
[address] [facility] [address] [address]      Pt Name: Deepti Torres  MRN: [id number]  [address] [facility] 1940  Date of evaluation: 10/7/2023  Provider: LUIS MANUEL Harp  PCP: West Dougherty MD  Note Started: 6:11 AM EDT     This patient was also seen and evaluated by the attending physician Pa Colón MD.    [address]       Chief Complaint   Patient presents with    Abdominal Pain     C/o abdominal pain, nausea, vomiting that started last night after dinner. Reports hx of crohn's. HISTORY OF PRESENT ILLNESS   (Location, Timing/Onset, Context/Setting, Quality, Duration, Modifying Factors, Severity, Associated Signs and Symptoms)  Note limiting factors. Deepti Torres is a 79 y.o. female who presents with complaint of abdominal pain, with an episode of vomiting and persistent nausea. Says symptoms began last night after dinner, and she was feeling well yesterday before the onset of symptoms. Says it felt like she had to have a bowel movement after symptoms started but she has not passed any stool. She has past history of Crohn disease, no history of abdominal surgery, but she says her Crohn's does not bother her often and when he does it usually does not feel this bad. Denies any trauma. No urinary complaints. No recent hospitalization. Denies any cough, cold symptoms, chest pain, shortness of breath. Denies fever. Nursing Notes were all reviewed and agreed with or any disagreements were addressed in the HPI. REVIEW OF SYSTEMS    (2-9 systems for level 4, 10 or more for level 5)     Positives and pertinent negatives as per HPI. PAST MEDICAL HISTORY     Past Medical History:   Diagnosis Date    Hyperlipidemia     Hypertension        SURGICAL HISTORY     Past Surgical History:   Procedure Laterality Date    APPENDECTOMY      COLONOSCOPY  11/01/2016    Dr. Vj Agustin polyps    COLONOSCOPY  02/13/2018    Flex. Sig. Saray Radford
Surgery. Patient will have NG placed per General Surgery. Patient will be admitted. Medicated for pain and nausea with morphine and Zofran. IV fluids administered in the emergency department. 1. Partial small bowel obstruction (HCC)      Disposition:  Admit    Is this patient to be included in the SEP-1 Core Measure due to severe sepsis or septic shock? No   Exclusion criteria - the patient is NOT to be included for SEP-1 Core Measure due to:   Infection is not suspected    (Please note that portions of this note were completed with a voice recognition program.  Efforts were made to edit the dictations but occasionally words are mis-transcribed.)    Valentin Bo MD  Attending Emergency Physician        Valentin Bo MD  10/07/23 [facility], Kelly Prather MD  10/07/23 6191

## 2023-10-07 NOTE — ED NOTES
Report called to [facility] on [facility] for room 4281. Transport placed.      Ruth Glass, GINNY  10/07/23 4924

## 2023-10-08 LAB
ANION GAP SERPL CALCULATED.3IONS-SCNC: 5 MMOL/L (ref 3–16)
BASOPHILS # BLD: 0 K/UL (ref 0–0.2)
BASOPHILS NFR BLD: 0.5 %
BUN SERPL-MCNC: 13 MG/DL (ref 7–20)
CALCIUM SERPL-MCNC: 7.1 MG/DL (ref 8.3–10.6)
CHLORIDE SERPL-SCNC: 108 MMOL/L (ref 99–110)
CO2 SERPL-SCNC: 27 MMOL/L (ref 21–32)
CREAT SERPL-MCNC: 0.7 MG/DL (ref 0.6–1.2)
CRP SERPL-MCNC: <3 MG/L (ref 0–5.1)
DEPRECATED RDW RBC AUTO: 15.1 % (ref 12.4–15.4)
EOSINOPHIL # BLD: 0.1 K/UL (ref 0–0.6)
EOSINOPHIL NFR BLD: 1.1 %
GFR SERPLBLD CREATININE-BSD FMLA CKD-EPI: >60 ML/MIN/{1.73_M2}
GLUCOSE SERPL-MCNC: 78 MG/DL (ref 70–99)
HCT VFR BLD AUTO: 31.1 % (ref 36–48)
HGB BLD-MCNC: 10.6 G/DL (ref 12–16)
LYMPHOCYTES # BLD: 1.2 K/UL (ref 1–5.1)
LYMPHOCYTES NFR BLD: 16.1 %
MCH RBC QN AUTO: 29.8 PG (ref 26–34)
MCHC RBC AUTO-ENTMCNC: 34 G/DL (ref 31–36)
MCV RBC AUTO: 87.7 FL (ref 80–100)
MONOCYTES # BLD: 0.7 K/UL (ref 0–1.3)
MONOCYTES NFR BLD: 9.8 %
NEUTROPHILS # BLD: 5.2 K/UL (ref 1.7–7.7)
NEUTROPHILS NFR BLD: 72.5 %
PLATELET # BLD AUTO: 281 K/UL (ref 135–450)
PMV BLD AUTO: 8.3 FL (ref 5–10.5)
POTASSIUM SERPL-SCNC: 3.7 MMOL/L (ref 3.5–5.1)
RBC # BLD AUTO: 3.54 M/UL (ref 4–5.2)
SODIUM SERPL-SCNC: 140 MMOL/L (ref 136–145)
WBC # BLD AUTO: 7.2 K/UL (ref 4–11)

## 2023-10-08 PROCEDURE — 1200000000 HC SEMI PRIVATE

## 2023-10-08 PROCEDURE — 94760 N-INVAS EAR/PLS OXIMETRY 1: CPT

## 2023-10-08 PROCEDURE — 6370000000 HC RX 637 (ALT 250 FOR IP): Performed by: FAMILY MEDICINE

## 2023-10-08 PROCEDURE — 6360000002 HC RX W HCPCS: Performed by: INTERNAL MEDICINE

## 2023-10-08 PROCEDURE — 86140 C-REACTIVE PROTEIN: CPT

## 2023-10-08 PROCEDURE — 36415 COLL VENOUS BLD VENIPUNCTURE: CPT

## 2023-10-08 PROCEDURE — 6360000002 HC RX W HCPCS: Performed by: FAMILY MEDICINE

## 2023-10-08 PROCEDURE — 85025 COMPLETE CBC W/AUTO DIFF WBC: CPT

## 2023-10-08 PROCEDURE — 99232 SBSQ HOSP IP/OBS MODERATE 35: CPT | Performed by: STUDENT IN AN ORGANIZED HEALTH CARE EDUCATION/TRAINING PROGRAM

## 2023-10-08 PROCEDURE — 83993 ASSAY FOR CALPROTECTIN FECAL: CPT

## 2023-10-08 PROCEDURE — 2580000003 HC RX 258: Performed by: FAMILY MEDICINE

## 2023-10-08 PROCEDURE — 80048 BASIC METABOLIC PNL TOTAL CA: CPT

## 2023-10-08 RX ORDER — METHYLPREDNISOLONE SODIUM SUCCINATE 40 MG/ML
20 INJECTION, POWDER, LYOPHILIZED, FOR SOLUTION INTRAMUSCULAR; INTRAVENOUS EVERY 8 HOURS
Status: DISCONTINUED | OUTPATIENT
Start: 2023-10-08 | End: 2023-10-10

## 2023-10-08 RX ADMIN — METHYLPREDNISOLONE SODIUM SUCCINATE 20 MG: 40 INJECTION INTRAMUSCULAR; INTRAVENOUS at 13:31

## 2023-10-08 RX ADMIN — LEVOTHYROXINE SODIUM 75 MCG: 0.07 TABLET ORAL at 05:53

## 2023-10-08 RX ADMIN — Medication 10 ML: at 20:50

## 2023-10-08 RX ADMIN — METHYLPREDNISOLONE SODIUM SUCCINATE 20 MG: 40 INJECTION INTRAMUSCULAR; INTRAVENOUS at 20:50

## 2023-10-08 RX ADMIN — ENOXAPARIN SODIUM 40 MG: 100 INJECTION SUBCUTANEOUS at 10:55

## 2023-10-08 RX ADMIN — SODIUM CHLORIDE: 9 INJECTION, SOLUTION INTRAVENOUS at 01:45

## 2023-10-08 RX ADMIN — ESTROGENS, CONJUGATED 0.3 MG: 0.3 TABLET, FILM COATED ORAL at 10:55

## 2023-10-08 ASSESSMENT — PAIN SCALES - GENERAL: PAINLEVEL_OUTOF10: 0

## 2023-10-08 NOTE — PLAN OF CARE
Problem: Discharge Planning  Goal: Discharge to home or other facility with appropriate resources  10/8/2023 1254 by Jluis Arana RN  Outcome: Progressing  10/8/2023 0424 by Nurys Franco RN  Outcome: Progressing  10/8/2023 0424 by Nurys Franco RN  Outcome: Progressing     Problem: Pain  Goal: Verbalizes/displays adequate comfort level or baseline comfort level  10/8/2023 1254 by Jluis Arana RN  Outcome: Progressing  10/8/2023 0424 by Nurys Franco RN  Outcome: Progressing  10/8/2023 0424 by Nurys Franco RN  Outcome: Progressing     Problem: ABCDS Injury Assessment  Goal: Absence of physical injury  10/8/2023 1254 by Jluis Arana RN  Outcome: Progressing  10/8/2023 0424 by Nurys Franco RN  Outcome: Progressing  10/8/2023 0424 by Nurys Franco RN  Outcome: Progressing

## 2023-10-08 NOTE — PLAN OF CARE
Problem: Discharge Planning  Goal: Discharge to home or other facility with appropriate resources  10/8/2023 0424 by Ritu Wing RN  Outcome: Progressing  10/8/2023 0424 by Ritu Wing RN  Outcome: Progressing     Problem: ABCDS Injury Assessment  Goal: Absence of physical injury  10/8/2023 0424 by Ritu Wing RN  Outcome: Progressing  10/8/2023 0424 by Ritu Wing RN  Outcome: Progressing

## 2023-10-09 PROBLEM — K56.600 PARTIAL SMALL BOWEL OBSTRUCTION (HCC): Status: ACTIVE | Noted: 2023-10-07

## 2023-10-09 LAB
ANION GAP SERPL CALCULATED.3IONS-SCNC: 9 MMOL/L (ref 3–16)
BASOPHILS # BLD: 0 K/UL (ref 0–0.2)
BASOPHILS NFR BLD: 0.2 %
BUN SERPL-MCNC: 9 MG/DL (ref 7–20)
CALCIUM SERPL-MCNC: 7.1 MG/DL (ref 8.3–10.6)
CHLORIDE SERPL-SCNC: 108 MMOL/L (ref 99–110)
CO2 SERPL-SCNC: 25 MMOL/L (ref 21–32)
CREAT SERPL-MCNC: 0.6 MG/DL (ref 0.6–1.2)
DEPRECATED RDW RBC AUTO: 15.1 % (ref 12.4–15.4)
EOSINOPHIL # BLD: 0 K/UL (ref 0–0.6)
EOSINOPHIL NFR BLD: 0 %
GFR SERPLBLD CREATININE-BSD FMLA CKD-EPI: >60 ML/MIN/{1.73_M2}
GLUCOSE SERPL-MCNC: 118 MG/DL (ref 70–99)
HCT VFR BLD AUTO: 33.8 % (ref 36–48)
HGB BLD-MCNC: 11.3 G/DL (ref 12–16)
LYMPHOCYTES # BLD: 0.6 K/UL (ref 1–5.1)
LYMPHOCYTES NFR BLD: 8.2 %
MCH RBC QN AUTO: 29.1 PG (ref 26–34)
MCHC RBC AUTO-ENTMCNC: 33.5 G/DL (ref 31–36)
MCV RBC AUTO: 86.9 FL (ref 80–100)
MONOCYTES # BLD: 0.4 K/UL (ref 0–1.3)
MONOCYTES NFR BLD: 6.1 %
NEUTROPHILS # BLD: 5.9 K/UL (ref 1.7–7.7)
NEUTROPHILS NFR BLD: 85.5 %
PLATELET # BLD AUTO: 297 K/UL (ref 135–450)
PMV BLD AUTO: 8.8 FL (ref 5–10.5)
POTASSIUM SERPL-SCNC: 4.4 MMOL/L (ref 3.5–5.1)
RBC # BLD AUTO: 3.89 M/UL (ref 4–5.2)
SODIUM SERPL-SCNC: 142 MMOL/L (ref 136–145)
WBC # BLD AUTO: 6.9 K/UL (ref 4–11)

## 2023-10-09 PROCEDURE — 6360000002 HC RX W HCPCS: Performed by: INTERNAL MEDICINE

## 2023-10-09 PROCEDURE — 94760 N-INVAS EAR/PLS OXIMETRY 1: CPT

## 2023-10-09 PROCEDURE — APPNB15 APP NON BILLABLE TIME 0-15 MINS: Performed by: PHYSICIAN ASSISTANT

## 2023-10-09 PROCEDURE — 36415 COLL VENOUS BLD VENIPUNCTURE: CPT

## 2023-10-09 PROCEDURE — 85025 COMPLETE CBC W/AUTO DIFF WBC: CPT

## 2023-10-09 PROCEDURE — 2580000003 HC RX 258: Performed by: FAMILY MEDICINE

## 2023-10-09 PROCEDURE — 80048 BASIC METABOLIC PNL TOTAL CA: CPT

## 2023-10-09 PROCEDURE — 99232 SBSQ HOSP IP/OBS MODERATE 35: CPT | Performed by: STUDENT IN AN ORGANIZED HEALTH CARE EDUCATION/TRAINING PROGRAM

## 2023-10-09 PROCEDURE — 6370000000 HC RX 637 (ALT 250 FOR IP): Performed by: FAMILY MEDICINE

## 2023-10-09 PROCEDURE — 1200000000 HC SEMI PRIVATE

## 2023-10-09 PROCEDURE — 6360000002 HC RX W HCPCS: Performed by: FAMILY MEDICINE

## 2023-10-09 PROCEDURE — 6370000000 HC RX 637 (ALT 250 FOR IP): Performed by: STUDENT IN AN ORGANIZED HEALTH CARE EDUCATION/TRAINING PROGRAM

## 2023-10-09 RX ORDER — LOSARTAN POTASSIUM 25 MG/1
25 TABLET ORAL DAILY
Status: DISCONTINUED | OUTPATIENT
Start: 2023-10-09 | End: 2023-10-11 | Stop reason: HOSPADM

## 2023-10-09 RX ADMIN — METHYLPREDNISOLONE SODIUM SUCCINATE 20 MG: 40 INJECTION INTRAMUSCULAR; INTRAVENOUS at 14:29

## 2023-10-09 RX ADMIN — Medication 10 ML: at 21:26

## 2023-10-09 RX ADMIN — METHYLPREDNISOLONE SODIUM SUCCINATE 20 MG: 40 INJECTION INTRAMUSCULAR; INTRAVENOUS at 05:08

## 2023-10-09 RX ADMIN — LEVOTHYROXINE SODIUM 75 MCG: 0.07 TABLET ORAL at 05:08

## 2023-10-09 RX ADMIN — ENOXAPARIN SODIUM 40 MG: 100 INJECTION SUBCUTANEOUS at 08:45

## 2023-10-09 RX ADMIN — LOSARTAN POTASSIUM 25 MG: 25 TABLET, FILM COATED ORAL at 14:37

## 2023-10-09 RX ADMIN — SODIUM CHLORIDE: 9 INJECTION, SOLUTION INTRAVENOUS at 03:36

## 2023-10-09 RX ADMIN — SODIUM CHLORIDE: 9 INJECTION, SOLUTION INTRAVENOUS at 14:31

## 2023-10-09 RX ADMIN — ESTROGENS, CONJUGATED 0.3 MG: 0.3 TABLET, FILM COATED ORAL at 08:45

## 2023-10-09 RX ADMIN — METHYLPREDNISOLONE SODIUM SUCCINATE 20 MG: 40 INJECTION INTRAMUSCULAR; INTRAVENOUS at 21:26

## 2023-10-09 NOTE — PLAN OF CARE
Problem: Discharge Planning  Goal: Discharge to home or other facility with appropriate resources  10/9/2023 0022 by Sanjay Drummond RN  Outcome: Progressing  10/8/2023 1254 by Susan Sommer RN  Outcome: Progressing     Problem: Pain  Goal: Verbalizes/displays adequate comfort level or baseline comfort level  10/9/2023 0022 by Sanjay Drummond RN  Outcome: Progressing  10/8/2023 1254 by Susan Sommer RN  Outcome: Progressing

## 2023-10-09 NOTE — ACP (ADVANCE CARE PLANNING)
Advance Care Planning     Advance Care Planning Activator (Inpatient)  Conversation Note      Date of ACP Conversation: 10/9/2023     Conversation Conducted with: Patient with Decision Making Capacity    ACP Activator: BERTHA Wagner      Health Care Decision Maker:     Current Designated Health Care Decision Maker:     Primary Decision Maker: Casa Newell Child - [phone]    Primary Decision Maker: Haris Hampton Child - [phone]    Secondary Decision Maker: Allison garcia - Brother/Sister - [phone]    Today we documented Decision Maker(s) consistent with Legal Next of Kin hierarchy. Care Preferences    Ventilation: \"If you were in your present state of health and suddenly became very ill and were unable to breathe on your own, what would your preference be about the use of a ventilator (breathing machine) if it were available to you? \"      Would the patient desire the use of ventilator (breathing machine)?: yes    \"If your health worsens and it becomes clear that your chance of recovery is unlikely, what would your preference be about the use of a ventilator (breathing machine) if it were available to you? \"     Would the patient desire the use of ventilator (breathing machine)?: Yes      Resuscitation  \"CPR works best to restart the heart when there is a sudden event, like a heart attack, in someone who is otherwise healthy. Unfortunately, CPR does not typically restart the heart for people who have serious health conditions or who are very sick. \"    \"In the event your heart stopped as a result of an underlying serious health condition, would you want attempts to be made to restart your heart (answer \"yes\" for attempt to resuscitate) or would you prefer a natural death (answer \"no\" for do not attempt to resuscitate)? \" yes       [x] Yes   [] No   Educated Patient / Willard Kent regarding differences between Advance Directives and portable DNR orders.     Length of ACP Conversation in minutes:  5

## 2023-10-09 NOTE — CARE COORDINATION
Case Management Assessment  Initial Evaluation    Date/Time of Evaluation: 10/9/2023 12:45 PM  Assessment Completed by: BERTHA Alvarez    If patient is discharged prior to next notation, then this note serves as note for discharge by case management. Patient Name: Feng Paz                   YOB: 1940  Diagnosis: SBO (small bowel obstruction) ([address]) [K56.609]  Partial small bowel obstruction ([address]) [K56.600]                   Date / Time: 10/7/2023  5:47 AM    Patient Admission Status: Inpatient   Readmission Risk (Low < 19, Mod (19-27), High > 27): Readmission Risk Score: 8.9    Current PCP: Michelle Roth MD  PCP verified by CM? Yes    Chart Reviewed: Yes      History Provided by: Child/Family  Patient Orientation: Alert and Oriented, Person, Place    Patient Cognition: Alert    Hospitalization in the last 30 days (Readmission):  No    If yes, Readmission Assessment in  Navigator will be completed.     Advance Directives:      Code Status: Full Code   Patient's Primary Decision Maker is:        Discharge Planning:    Patient lives with: (P) Alone Type of Home: (P) House  Primary Care Giver: Self  Patient Support Systems include: Children   Current Financial resources: (P) None  Current community resources: (P) None  Current services prior to admission: (P) None            Current DME:              Type of Home Care services:  (P) None    ADLS  Prior functional level: (P) Independent in ADLs/IADLs  Current functional level: (P) Independent in ADLs/IADLs    PT AM-PAC:   /24  OT AM-PAC:   /24    Family can provide assistance at DC: (P) Yes  Would you like Case Management to discuss the discharge plan with any other family members/significant others, and if so, who? (P) Yes (children)  Plans to Return to Present Housing: (P) Yes  Other Identified Issues/Barriers to RETURNING to current housing: lives alone  Potential Assistance needed at discharge: (P) N/A            Potential DME:

## 2023-10-10 PROBLEM — K56.609 SBO (SMALL BOWEL OBSTRUCTION) (HCC): Status: ACTIVE | Noted: 2023-10-10

## 2023-10-10 LAB
ANION GAP SERPL CALCULATED.3IONS-SCNC: 11 MMOL/L (ref 3–16)
BASOPHILS # BLD: 0 K/UL (ref 0–0.2)
BASOPHILS NFR BLD: 0.3 %
BUN SERPL-MCNC: 10 MG/DL (ref 7–20)
CALCIUM SERPL-MCNC: 7.9 MG/DL (ref 8.3–10.6)
CHLORIDE SERPL-SCNC: 107 MMOL/L (ref 99–110)
CO2 SERPL-SCNC: 24 MMOL/L (ref 21–32)
CREAT SERPL-MCNC: 0.7 MG/DL (ref 0.6–1.2)
DEPRECATED RDW RBC AUTO: 15.2 % (ref 12.4–15.4)
EOSINOPHIL # BLD: 0 K/UL (ref 0–0.6)
EOSINOPHIL NFR BLD: 0.3 %
GFR SERPLBLD CREATININE-BSD FMLA CKD-EPI: >60 ML/MIN/{1.73_M2}
GLUCOSE SERPL-MCNC: 95 MG/DL (ref 70–99)
HCT VFR BLD AUTO: 36.1 % (ref 36–48)
HGB BLD-MCNC: 12.2 G/DL (ref 12–16)
LYMPHOCYTES # BLD: 0.9 K/UL (ref 1–5.1)
LYMPHOCYTES NFR BLD: 11.8 %
MCH RBC QN AUTO: 29 PG (ref 26–34)
MCHC RBC AUTO-ENTMCNC: 33.7 G/DL (ref 31–36)
MCV RBC AUTO: 86 FL (ref 80–100)
MONOCYTES # BLD: 0.4 K/UL (ref 0–1.3)
MONOCYTES NFR BLD: 5.7 %
NEUTROPHILS # BLD: 6.3 K/UL (ref 1.7–7.7)
NEUTROPHILS NFR BLD: 81.9 %
PLATELET # BLD AUTO: 338 K/UL (ref 135–450)
PMV BLD AUTO: 8.6 FL (ref 5–10.5)
POTASSIUM SERPL-SCNC: 3.7 MMOL/L (ref 3.5–5.1)
RBC # BLD AUTO: 4.2 M/UL (ref 4–5.2)
SODIUM SERPL-SCNC: 142 MMOL/L (ref 136–145)
WBC # BLD AUTO: 7.7 K/UL (ref 4–11)

## 2023-10-10 PROCEDURE — 6360000002 HC RX W HCPCS: Performed by: INTERNAL MEDICINE

## 2023-10-10 PROCEDURE — 6370000000 HC RX 637 (ALT 250 FOR IP): Performed by: FAMILY MEDICINE

## 2023-10-10 PROCEDURE — 99232 SBSQ HOSP IP/OBS MODERATE 35: CPT | Performed by: STUDENT IN AN ORGANIZED HEALTH CARE EDUCATION/TRAINING PROGRAM

## 2023-10-10 PROCEDURE — 2580000003 HC RX 258: Performed by: FAMILY MEDICINE

## 2023-10-10 PROCEDURE — 6360000002 HC RX W HCPCS: Performed by: FAMILY MEDICINE

## 2023-10-10 PROCEDURE — APPSS15 APP SPLIT SHARED TIME 0-15 MINUTES: Performed by: PHYSICIAN ASSISTANT

## 2023-10-10 PROCEDURE — 85025 COMPLETE CBC W/AUTO DIFF WBC: CPT

## 2023-10-10 PROCEDURE — APPNB15 APP NON BILLABLE TIME 0-15 MINS: Performed by: PHYSICIAN ASSISTANT

## 2023-10-10 PROCEDURE — 6370000000 HC RX 637 (ALT 250 FOR IP): Performed by: STUDENT IN AN ORGANIZED HEALTH CARE EDUCATION/TRAINING PROGRAM

## 2023-10-10 PROCEDURE — 36415 COLL VENOUS BLD VENIPUNCTURE: CPT

## 2023-10-10 PROCEDURE — 80048 BASIC METABOLIC PNL TOTAL CA: CPT

## 2023-10-10 PROCEDURE — 1200000000 HC SEMI PRIVATE

## 2023-10-10 PROCEDURE — 6370000000 HC RX 637 (ALT 250 FOR IP): Performed by: INTERNAL MEDICINE

## 2023-10-10 PROCEDURE — 6370000000 HC RX 637 (ALT 250 FOR IP): Performed by: PHYSICIAN ASSISTANT

## 2023-10-10 PROCEDURE — 94760 N-INVAS EAR/PLS OXIMETRY 1: CPT

## 2023-10-10 RX ORDER — SENNA AND DOCUSATE SODIUM 50; 8.6 MG/1; MG/1
1 TABLET, FILM COATED ORAL 2 TIMES DAILY
Status: DISCONTINUED | OUTPATIENT
Start: 2023-10-10 | End: 2023-10-11 | Stop reason: HOSPADM

## 2023-10-10 RX ORDER — CALCIUM CARBONATE 500 MG/1
500 TABLET, CHEWABLE ORAL 3 TIMES DAILY PRN
Status: DISCONTINUED | OUTPATIENT
Start: 2023-10-10 | End: 2023-10-11 | Stop reason: HOSPADM

## 2023-10-10 RX ORDER — PREDNISONE 20 MG/1
40 TABLET ORAL DAILY
Status: DISCONTINUED | OUTPATIENT
Start: 2023-10-10 | End: 2023-10-11 | Stop reason: HOSPADM

## 2023-10-10 RX ORDER — POLYETHYLENE GLYCOL 3350 17 G/17G
17 POWDER, FOR SOLUTION ORAL DAILY
Status: DISCONTINUED | OUTPATIENT
Start: 2023-10-10 | End: 2023-10-11 | Stop reason: HOSPADM

## 2023-10-10 RX ADMIN — LOSARTAN POTASSIUM 25 MG: 25 TABLET, FILM COATED ORAL at 09:09

## 2023-10-10 RX ADMIN — SENNOSIDES AND DOCUSATE SODIUM 1 TABLET: 50; 8.6 TABLET ORAL at 10:48

## 2023-10-10 RX ADMIN — Medication 10 ML: at 21:57

## 2023-10-10 RX ADMIN — LEVOTHYROXINE SODIUM 75 MCG: 0.07 TABLET ORAL at 05:41

## 2023-10-10 RX ADMIN — Medication 10 ML: at 09:09

## 2023-10-10 RX ADMIN — POLYETHYLENE GLYCOL 3350 17 G: 17 POWDER, FOR SOLUTION ORAL at 10:48

## 2023-10-10 RX ADMIN — METHYLPREDNISOLONE SODIUM SUCCINATE 20 MG: 40 INJECTION INTRAMUSCULAR; INTRAVENOUS at 05:42

## 2023-10-10 RX ADMIN — SENNOSIDES AND DOCUSATE SODIUM 1 TABLET: 50; 8.6 TABLET ORAL at 21:58

## 2023-10-10 RX ADMIN — PREDNISONE 40 MG: 20 TABLET ORAL at 10:48

## 2023-10-10 RX ADMIN — ENOXAPARIN SODIUM 40 MG: 100 INJECTION SUBCUTANEOUS at 09:09

## 2023-10-10 RX ADMIN — ESTROGENS, CONJUGATED 0.3 MG: 0.3 TABLET, FILM COATED ORAL at 09:09

## 2023-10-10 RX ADMIN — ANTACID TABLETS 500 MG: 500 TABLET, CHEWABLE ORAL at 18:17

## 2023-10-10 NOTE — PLAN OF CARE
Problem: Discharge Planning  Goal: Discharge to home or other facility with appropriate resources  10/10/2023 0947 by Renzo Becker RN  Flowsheets (Taken 10/10/2023 6008)  Discharge to home or other facility with appropriate resources:   Identify barriers to discharge with patient and caregiver   Arrange for needed discharge resources and transportation as appropriate  10/9/2023 2348 by Annamarie Nevarez RN  Outcome: Progressing     Problem: Pain  Goal: Verbalizes/displays adequate comfort level or baseline comfort level  10/10/2023 0947 by Renzo Becker RN  Flowsheets (Taken 10/10/2023 6027)  Verbalizes/displays adequate comfort level or baseline comfort level:   Encourage patient to monitor pain and request assistance   Assess pain using appropriate pain scale  10/9/2023 2348 by Annamarie Nevarez RN  Outcome: Progressing     Problem: ABCDS Injury Assessment  Goal: Absence of physical injury  10/10/2023 0947 by Renzo Becker RN  Flowsheets (Taken 10/10/2023 1130)  Absence of Physical Injury: Implement safety measures based on patient assessment

## 2023-10-11 VITALS
SYSTOLIC BLOOD PRESSURE: 149 MMHG | HEART RATE: 76 BPM | TEMPERATURE: 98.5 F | DIASTOLIC BLOOD PRESSURE: 79 MMHG | RESPIRATION RATE: 15 BRPM | BODY MASS INDEX: 27.27 KG/M2 | OXYGEN SATURATION: 97 % | HEIGHT: 60 IN | WEIGHT: 138.89 LBS

## 2023-10-11 LAB
ANION GAP SERPL CALCULATED.3IONS-SCNC: 10 MMOL/L (ref 3–16)
BASOPHILS # BLD: 0 K/UL (ref 0–0.2)
BASOPHILS NFR BLD: 0.4 %
BUN SERPL-MCNC: 15 MG/DL (ref 7–20)
CALCIUM SERPL-MCNC: 8.7 MG/DL (ref 8.3–10.6)
CALPROTECTIN STL-MCNT: 22 UG/G
CHLORIDE SERPL-SCNC: 102 MMOL/L (ref 99–110)
CO2 SERPL-SCNC: 27 MMOL/L (ref 21–32)
CREAT SERPL-MCNC: 0.8 MG/DL (ref 0.6–1.2)
DEPRECATED RDW RBC AUTO: 15.1 % (ref 12.4–15.4)
EOSINOPHIL # BLD: 0.1 K/UL (ref 0–0.6)
EOSINOPHIL NFR BLD: 0.9 %
GFR SERPLBLD CREATININE-BSD FMLA CKD-EPI: >60 ML/MIN/{1.73_M2}
GLUCOSE SERPL-MCNC: 91 MG/DL (ref 70–99)
HCT VFR BLD AUTO: 36.7 % (ref 36–48)
HGB BLD-MCNC: 12.5 G/DL (ref 12–16)
LYMPHOCYTES # BLD: 2 K/UL (ref 1–5.1)
LYMPHOCYTES NFR BLD: 22.5 %
MCH RBC QN AUTO: 29.5 PG (ref 26–34)
MCHC RBC AUTO-ENTMCNC: 34 G/DL (ref 31–36)
MCV RBC AUTO: 86.8 FL (ref 80–100)
MONOCYTES # BLD: 1 K/UL (ref 0–1.3)
MONOCYTES NFR BLD: 11.3 %
NEUTROPHILS # BLD: 5.7 K/UL (ref 1.7–7.7)
NEUTROPHILS NFR BLD: 64.9 %
PLATELET # BLD AUTO: 368 K/UL (ref 135–450)
PMV BLD AUTO: 8.6 FL (ref 5–10.5)
POTASSIUM SERPL-SCNC: 3.7 MMOL/L (ref 3.5–5.1)
RBC # BLD AUTO: 4.23 M/UL (ref 4–5.2)
SODIUM SERPL-SCNC: 139 MMOL/L (ref 136–145)
WBC # BLD AUTO: 8.8 K/UL (ref 4–11)

## 2023-10-11 PROCEDURE — 36415 COLL VENOUS BLD VENIPUNCTURE: CPT

## 2023-10-11 PROCEDURE — 85025 COMPLETE CBC W/AUTO DIFF WBC: CPT

## 2023-10-11 PROCEDURE — 2580000003 HC RX 258: Performed by: FAMILY MEDICINE

## 2023-10-11 PROCEDURE — 6360000002 HC RX W HCPCS: Performed by: FAMILY MEDICINE

## 2023-10-11 PROCEDURE — 6370000000 HC RX 637 (ALT 250 FOR IP): Performed by: FAMILY MEDICINE

## 2023-10-11 PROCEDURE — 6370000000 HC RX 637 (ALT 250 FOR IP): Performed by: STUDENT IN AN ORGANIZED HEALTH CARE EDUCATION/TRAINING PROGRAM

## 2023-10-11 PROCEDURE — 94760 N-INVAS EAR/PLS OXIMETRY 1: CPT

## 2023-10-11 PROCEDURE — 99232 SBSQ HOSP IP/OBS MODERATE 35: CPT | Performed by: STUDENT IN AN ORGANIZED HEALTH CARE EDUCATION/TRAINING PROGRAM

## 2023-10-11 PROCEDURE — APPNB15 APP NON BILLABLE TIME 0-15 MINS: Performed by: PHYSICIAN ASSISTANT

## 2023-10-11 PROCEDURE — APPSS15 APP SPLIT SHARED TIME 0-15 MINUTES: Performed by: PHYSICIAN ASSISTANT

## 2023-10-11 PROCEDURE — 80048 BASIC METABOLIC PNL TOTAL CA: CPT

## 2023-10-11 PROCEDURE — 6370000000 HC RX 637 (ALT 250 FOR IP): Performed by: PHYSICIAN ASSISTANT

## 2023-10-11 RX ORDER — PREDNISONE 20 MG/1
40 TABLET ORAL SEE ADMIN INSTRUCTIONS
Qty: 25 TABLET | Refills: 0 | Status: SHIPPED | OUTPATIENT
Start: 2023-10-11 | End: 2023-10-11 | Stop reason: SDUPTHER

## 2023-10-11 RX ORDER — PREDNISONE 20 MG/1
40 TABLET ORAL SEE ADMIN INSTRUCTIONS
Qty: 25 TABLET | Refills: 0 | Status: SHIPPED | OUTPATIENT
Start: 2023-10-11 | End: 2023-10-31

## 2023-10-11 RX ADMIN — ENOXAPARIN SODIUM 40 MG: 100 INJECTION SUBCUTANEOUS at 08:49

## 2023-10-11 RX ADMIN — SENNOSIDES AND DOCUSATE SODIUM 1 TABLET: 50; 8.6 TABLET ORAL at 08:49

## 2023-10-11 RX ADMIN — LEVOTHYROXINE SODIUM 75 MCG: 0.07 TABLET ORAL at 05:08

## 2023-10-11 RX ADMIN — Medication 10 ML: at 08:49

## 2023-10-11 RX ADMIN — PREDNISONE 40 MG: 20 TABLET ORAL at 08:49

## 2023-10-11 RX ADMIN — ESTROGENS, CONJUGATED 0.3 MG: 0.3 TABLET, FILM COATED ORAL at 08:51

## 2023-10-11 RX ADMIN — POLYETHYLENE GLYCOL 3350 17 G: 17 POWDER, FOR SOLUTION ORAL at 08:48

## 2023-10-11 RX ADMIN — LOSARTAN POTASSIUM 25 MG: 25 TABLET, FILM COATED ORAL at 08:49

## 2023-10-11 NOTE — DISCHARGE INSTRUCTIONS
Continue oral prednisone 40 mg daily with taper (40 mg x 5 days, 30 mg x 5 days, 20 mg x 5 days, 10 mg x 5 days.     followup with Dr. Rusty Glover 1-2 weeks

## 2023-10-11 NOTE — PLAN OF CARE
Problem: Discharge Planning  Goal: Discharge to home or other facility with appropriate resources  10/11/2023 0925 by Demetrio Frost RN  Flowsheets (Taken 10/11/2023 6064)  Discharge to home or other facility with appropriate resources:   Identify barriers to discharge with patient and caregiver   Arrange for needed discharge resources and transportation as appropriate   Identify discharge learning needs (meds, wound care, etc)     Problem: Pain  Goal: Verbalizes/displays adequate comfort level or baseline comfort level  10/11/2023 0925 by Demetrio Frost RN  Flowsheets (Taken 10/11/2023 2231)  Verbalizes/displays adequate comfort level or baseline comfort level:   Encourage patient to monitor pain and request assistance   Assess pain using appropriate pain scale     Problem: ABCDS Injury Assessment  Goal: Absence of physical injury  10/11/2023 0925 by Demetrio Frost RN  Flowsheets (Taken 10/11/2023 2855)  Absence of Physical Injury: Implement safety measures based on patient assessment

## 2023-10-11 NOTE — PLAN OF CARE
Problem: Discharge Planning  Goal: Discharge to home or other facility with appropriate resources  10/11/2023 1515 by Shayy Jacinto RN  Outcome: Completed  10/11/2023 0925 by Shayy Jacinto RN  Flowsheets (Taken 10/11/2023 4311)  Discharge to home or other facility with appropriate resources:   Identify barriers to discharge with patient and caregiver   Arrange for needed discharge resources and transportation as appropriate   Identify discharge learning needs (meds, wound care, etc)     Problem: Pain  Goal: Verbalizes/displays adequate comfort level or baseline comfort level  10/11/2023 1515 by Shayy Jacinto RN  Outcome: Completed  10/11/2023 0925 by Shayy Jacinto RN  Flowsheets (Taken 10/11/2023 1466)  Verbalizes/displays adequate comfort level or baseline comfort level:   Encourage patient to monitor pain and request assistance   Assess pain using appropriate pain scale     Problem: ABCDS Injury Assessment  Goal: Absence of physical injury  10/11/2023 1515 by Shayy Jacinto RN  Outcome: Completed  10/11/2023 0925 by Shayy Jacinto RN  Flowsheets (Taken 10/11/2023 5648)  Absence of Physical Injury: Implement safety measures based on patient assessment

## 2023-10-11 NOTE — DISCHARGE INSTR - COC
{IP PT MENTAL STATUS:}    IV Access:  { ELISA IV ACCESS:075398810}    Nursing Mobility/ADLs:  Walking   {P DME TZAC:093963730}  Transfer  {P DME VJOL:745738203}  Bathing  {CHP DME HBTF:009436596}  Dressing  {CHP DME TMDM:115872184}  Toileting  {P DME KEMX:233927938}  Feeding  {P DME ZIBD:488180408}  Med Admin  {P DME AILL:715680174}  Med Delivery   { ELISA MED Delivery:079128122}    Wound Care Documentation and Therapy:        Elimination:  Continence: Bowel: {YES / TR:41204}  Bladder: {YES / PF:91874}  Urinary Catheter: {Urinary Catheter:372099917}   Colostomy/Ileostomy/Ileal Conduit: {YES / H}       Date of Last BM: ***    Intake/Output Summary (Last 24 hours) at 10/11/2023 1217  Last data filed at 10/11/2023 0925  Gross per 24 hour   Intake 820 ml   Output --   Net 820 ml     I/O last 3 completed shifts:   In: 800 [P.O.:800]  Out: -     Safety Concerns:     [address] [place] [address] Safety Concerns:475614615}    Impairments/Disabilities:      [address] [place] [address] Impairments/Disabilities:750856218}    Nutrition Therapy:  Current Nutrition Therapy:   [address] [place] [address] Diet List:436069761}    Routes of Feeding: {[facility] DME Other Feedings:296365982}  Liquids: {Slp liquid thickness:63891}  Daily Fluid Restriction: {P DME Yes amt example:598724959}  Last Modified Barium Swallow with Video (Video Swallowing Test): {Done Not Done UNGJ:353504695}    Treatments at the Time of Hospital Discharge:   Respiratory Treatments: ***  Oxygen Therapy:  {Therapy; copd oxygen:53501}  Ventilator:    { CC Vent VMNR:293342039}    Rehab Therapies: {THERAPEUTIC INTERVENTION:4969922134}  Weight Bearing Status/Restrictions: [facility] Weight Bearin}  Other Medical Equipment (for information only, NOT a DME order):  {EQUIPMENT:052955504}  Other Treatments: ***    Patient's personal belongings (please select all that are sent with patient):  {LASHAY DME Belongings:666888536}    RN SIGNATURE:  {Esignature:617180228}    CASE MANAGEMENT/SOCIAL WORK

## 2023-10-11 NOTE — DISCHARGE SUMMARY
V2.0  Discharge Summary    Name:  Milo Haines /Age/Sex: 1940 (6 y.o. female)   Admit Date: 10/7/2023  Discharge Date: 10/11/23    MRN & CSN:  [id number] & [id number] Encounter Date and Time 10/11/23 2:44 PM EDT    Attending:  Destiney Villalba MD Discharging Provider: Antoni Jacobsen MD       Hospital Course:     Brief HPI: Milo Haines is a 79 y.o. female with pmh of Crohn's disease on Humira with ileocolic resection/anastamosis  who presents with n/v, abdominal pain that started yesterday. Symptoms relatively quick and progressive, no symptoms the day prior. Several days since BM. No fevers, chills, focal symptoms of infection. In the ED, she is mildly HTN but otherwise labs and vital signs reassuring. CT abdomen shows concern for partial bowel obstruction proximal to prior anastamotic site. Medicine consulted for admission. As per GI note   History of small bowel Crohn's disease- Followed by Dr. Jayesh Mcduffie. Dx in . Prior ileocecectomy . Failed pentasa. Transitioned to Humira in  due to flare (elevated calprotectin and steroid responsive). Last colonoscopy in . Had MRE in 2023 normal.  Prior hx of elevated calprotectin with prior flares. CT with PSBO and transition near to anastomosis. Suspect fibrotic stricture. On Humira qow. No missed doses. last dose was yesterday. May need to consider checking Adalimumab antibody and trough levels     Brief Problem Based Course: 1. 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 History of CD on Humira since    3. Normocytic normochromic anemia   4. Hypothyroidism      Patient was evaluated by GI and Gen Surgery. Improved with conservative management. D/c on tapering prednisone as per GI and follow up with patient's GI doc. The patient expressed appropriate understanding of, and agreement with the discharge recommendations, medications, and plan.      Consults this admission:  IP CONSULT TO

## 2023-10-11 NOTE — CARE COORDINATION
CASE MANAGEMENT DISCHARGE SUMMARY:    DISCHARGE DATE: 10/11/2023    DISCHARGED TO: Home with family support     TRANSPORTATION: Family to transport             TIME: TBD by pt and bedside RN     PREFERRED PHARMACY:      [address] [place] [address] [id number] - [address] [phone] - F [phone]    COMMENTS: pt to dc home with family support. Denies needs. Family will transport. Pt and bedside RN aware of dc timeline.      Electronically signed by Lavon Suero RN on 10/11/2023 at 12:16 PM  Ph: [phone]  Fax: [phone]

## 2023-10-18 ENCOUNTER — APPOINTMENT (OUTPATIENT)
Dept: CT IMAGING | Age: 83
End: 2023-10-18
Payer: MEDICARE

## 2023-10-18 ENCOUNTER — HOSPITAL ENCOUNTER (EMERGENCY)
Age: 83
Discharge: HOME | End: 2023-10-18
Payer: MEDICARE

## 2023-10-18 VITALS
HEIGHT: 62 IN | RESPIRATION RATE: 16 BRPM | HEART RATE: 64 BPM | DIASTOLIC BLOOD PRESSURE: 86 MMHG | SYSTOLIC BLOOD PRESSURE: 160 MMHG | WEIGHT: 130 LBS | BODY MASS INDEX: 23.92 KG/M2 | OXYGEN SATURATION: 98 % | TEMPERATURE: 97.8 F

## 2023-10-18 DIAGNOSIS — R51.9 NONINTRACTABLE HEADACHE, UNSPECIFIED CHRONICITY PATTERN, UNSPECIFIED HEADACHE TYPE: Primary | ICD-10-CM

## 2023-10-18 LAB
ALBUMIN SERPL-MCNC: 4.1 G/DL (ref 3.4–5)
ALBUMIN/GLOB SERPL: 1.4 {RATIO} (ref 1.1–2.2)
ALP SERPL-CCNC: 69 U/L (ref 40–129)
ALT SERPL-CCNC: 40 U/L (ref 10–40)
ANION GAP SERPL CALCULATED.3IONS-SCNC: 11 MMOL/L (ref 3–16)
AST SERPL-CCNC: 36 U/L (ref 15–37)
BASOPHILS # BLD: 0 K/UL (ref 0–0.2)
BASOPHILS NFR BLD: 0.5 %
BILIRUB SERPL-MCNC: 0.4 MG/DL (ref 0–1)
BILIRUB UR QL STRIP.AUTO: NEGATIVE
BUN SERPL-MCNC: 30 MG/DL (ref 7–20)
CALCIUM SERPL-MCNC: 8.7 MG/DL (ref 8.3–10.6)
CHLORIDE SERPL-SCNC: 102 MMOL/L (ref 99–110)
CLARITY UR: CLEAR
CO2 SERPL-SCNC: 22 MMOL/L (ref 21–32)
COLOR UR: YELLOW
CREAT SERPL-MCNC: 1 MG/DL (ref 0.6–1.2)
DEPRECATED RDW RBC AUTO: 15.7 % (ref 12.4–15.4)
EOSINOPHIL # BLD: 0 K/UL (ref 0–0.6)
EOSINOPHIL NFR BLD: 0.1 %
GFR SERPLBLD CREATININE-BSD FMLA CKD-EPI: 56 ML/MIN/{1.73_M2}
GLUCOSE SERPL-MCNC: 137 MG/DL (ref 70–99)
GLUCOSE UR STRIP.AUTO-MCNC: NEGATIVE MG/DL
HCT VFR BLD AUTO: 38.8 % (ref 36–48)
HGB BLD-MCNC: 12.9 G/DL (ref 12–16)
HGB UR QL STRIP.AUTO: NEGATIVE
KETONES UR STRIP.AUTO-MCNC: NEGATIVE MG/DL
LEUKOCYTE ESTERASE UR QL STRIP.AUTO: NEGATIVE
LYMPHOCYTES # BLD: 0.5 K/UL (ref 1–5.1)
LYMPHOCYTES NFR BLD: 6 %
MCH RBC QN AUTO: 29.4 PG (ref 26–34)
MCHC RBC AUTO-ENTMCNC: 33.2 G/DL (ref 31–36)
MCV RBC AUTO: 88.5 FL (ref 80–100)
MONOCYTES # BLD: 0.2 K/UL (ref 0–1.3)
MONOCYTES NFR BLD: 2.8 %
NEUTROPHILS # BLD: 7.2 K/UL (ref 1.7–7.7)
NEUTROPHILS NFR BLD: 90.6 %
NITRITE UR QL STRIP.AUTO: NEGATIVE
PH UR STRIP.AUTO: 6.5 [PH] (ref 5–8)
PLATELET # BLD AUTO: 383 K/UL (ref 135–450)
PMV BLD AUTO: 8.4 FL (ref 5–10.5)
POTASSIUM SERPL-SCNC: 5.4 MMOL/L (ref 3.5–5.1)
PROT SERPL-MCNC: 7 G/DL (ref 6.4–8.2)
PROT UR STRIP.AUTO-MCNC: NEGATIVE MG/DL
RBC # BLD AUTO: 4.39 M/UL (ref 4–5.2)
SODIUM SERPL-SCNC: 135 MMOL/L (ref 136–145)
SP GR UR STRIP.AUTO: <=1.005 (ref 1–1.03)
UA DIPSTICK W REFLEX MICRO PNL UR: NORMAL
URN SPEC COLLECT METH UR: NORMAL
UROBILINOGEN UR STRIP-ACNC: 0.2 E.U./DL
WBC # BLD AUTO: 8 K/UL (ref 4–11)

## 2023-10-18 PROCEDURE — 80053 COMPREHEN METABOLIC PANEL: CPT

## 2023-10-18 PROCEDURE — 6360000002 HC RX W HCPCS: Performed by: PHYSICIAN ASSISTANT

## 2023-10-18 PROCEDURE — 96374 THER/PROPH/DIAG INJ IV PUSH: CPT

## 2023-10-18 PROCEDURE — 99284 EMERGENCY DEPT VISIT MOD MDM: CPT

## 2023-10-18 PROCEDURE — 81003 URINALYSIS AUTO W/O SCOPE: CPT

## 2023-10-18 PROCEDURE — 85025 COMPLETE CBC W/AUTO DIFF WBC: CPT

## 2023-10-18 PROCEDURE — 96375 TX/PRO/DX INJ NEW DRUG ADDON: CPT

## 2023-10-18 PROCEDURE — 2580000003 HC RX 258: Performed by: PHYSICIAN ASSISTANT

## 2023-10-18 PROCEDURE — 70450 CT HEAD/BRAIN W/O DYE: CPT

## 2023-10-18 RX ORDER — DIPHENHYDRAMINE HYDROCHLORIDE 50 MG/ML
25 INJECTION INTRAMUSCULAR; INTRAVENOUS ONCE
Status: COMPLETED | OUTPATIENT
Start: 2023-10-18 | End: 2023-10-18

## 2023-10-18 RX ORDER — METOCLOPRAMIDE HYDROCHLORIDE 5 MG/ML
5 INJECTION INTRAMUSCULAR; INTRAVENOUS ONCE
Status: COMPLETED | OUTPATIENT
Start: 2023-10-18 | End: 2023-10-18

## 2023-10-18 RX ORDER — BUTALBITAL, ACETAMINOPHEN, CAFFEINE AND CODEINE PHOSPHATE 300; 50; 40; 30 MG/1; MG/1; MG/1; MG/1
1 CAPSULE ORAL EVERY 4 HOURS PRN
Qty: 4 CAPSULE | Refills: 0 | Status: SHIPPED | OUTPATIENT
Start: 2023-10-18 | End: 2023-10-22

## 2023-10-18 RX ORDER — 0.9 % SODIUM CHLORIDE 0.9 %
1000 INTRAVENOUS SOLUTION INTRAVENOUS ONCE
Status: COMPLETED | OUTPATIENT
Start: 2023-10-18 | End: 2023-10-18

## 2023-10-18 RX ADMIN — METOCLOPRAMIDE HYDROCHLORIDE 5 MG: 5 INJECTION INTRAMUSCULAR; INTRAVENOUS at 14:16

## 2023-10-18 RX ADMIN — SODIUM CHLORIDE 1000 ML: 9 INJECTION, SOLUTION INTRAVENOUS at 14:25

## 2023-10-18 RX ADMIN — DIPHENHYDRAMINE HYDROCHLORIDE 25 MG: 50 INJECTION INTRAMUSCULAR; INTRAVENOUS at 14:18

## 2023-10-18 ASSESSMENT — PAIN DESCRIPTION - DESCRIPTORS: DESCRIPTORS: ACHING

## 2023-10-18 ASSESSMENT — PAIN SCALES - GENERAL: PAINLEVEL_OUTOF10: 10

## 2023-10-18 ASSESSMENT — PAIN - FUNCTIONAL ASSESSMENT: PAIN_FUNCTIONAL_ASSESSMENT: 0-10

## 2023-10-18 ASSESSMENT — PAIN DESCRIPTION - PAIN TYPE: TYPE: ACUTE PAIN

## 2023-10-18 NOTE — ED PROVIDER NOTES
patient is NOT to be included for SEP-1 Core Measure due to: Infection is not suspected    Records Reviewed:   Brief chart review performed without relevant records identified. .    Chronic conditions affecting care:   Hyperlipidemia and hypertension. has a past medical history of Hyperlipidemia and Hypertension. Social Determinants:   None identified. Consults:   None necessary at this time. Reassessment:      Patient received a 1 L IV fluid bolus along with a dose of Benadryl and Reglan for headache. States that she is feeling significantly better on reevaluation. Vitals have remained stable. MDM/Disposition Considerations:   Briefly David Goss presents for headache for 1 week. I did obtain head CT to evaluate for any structural abnormalities of which none were identified. Little suspicion for subarachnoid hemorrhage and need for LP again based on timing and duration of headache. Did also obtain basic labs which were unremarkable and a urine sample showing no evidence for infectious process. Patient's headache is now controlled and she does feel well enough to discharge home which she would like to do at this time. We have discussed recommendations for follow-up otherwise and patient is in agreement and comfortable with discharge. Critical Care Time:   10 Minutes of critical care time spent not including separately billable procedures. DDx:  I estimate there is LOW risk for SUBARACHNOID HEMORRHAGE, MENINGITIS, INTRACRANIAL HEMORRHAGE, SUBDURAL HEMATOMA, OR STROKE, thus I consider the discharge disposition reasonable. David Goss and I have also discussed returning to the Emergency Department immediately if new or worsening symptoms occur. We have discussed the symptoms which are most concerning (e.g., changing or worsening pain, weakness, vomiting, fever) that necessitate immediate return. FINAL IMPRESSION  1.  Nonintractable headache, unspecified chronicity pattern,

## 2023-10-27 ENCOUNTER — APPOINTMENT (OUTPATIENT)
Dept: GENERAL RADIOLOGY | Age: 83
End: 2023-10-27
Payer: MEDICARE

## 2023-10-27 ENCOUNTER — HOSPITAL ENCOUNTER (EMERGENCY)
Age: 83
Discharge: HOME | End: 2023-10-27
Attending: EMERGENCY MEDICINE
Payer: MEDICARE

## 2023-10-27 ENCOUNTER — APPOINTMENT (OUTPATIENT)
Dept: CT IMAGING | Age: 83
End: 2023-10-27
Payer: MEDICARE

## 2023-10-27 VITALS
TEMPERATURE: 98 F | DIASTOLIC BLOOD PRESSURE: 57 MMHG | BODY MASS INDEX: 24.76 KG/M2 | OXYGEN SATURATION: 96 % | HEART RATE: 62 BPM | WEIGHT: 135.36 LBS | RESPIRATION RATE: 14 BRPM | SYSTOLIC BLOOD PRESSURE: 132 MMHG

## 2023-10-27 DIAGNOSIS — R11.2 NAUSEA AND VOMITING, UNSPECIFIED VOMITING TYPE: Primary | ICD-10-CM

## 2023-10-27 DIAGNOSIS — Z87.19 HISTORY OF CROHN'S DISEASE: ICD-10-CM

## 2023-10-27 LAB
ALBUMIN SERPL-MCNC: 4.1 G/DL (ref 3.4–5)
ALBUMIN/GLOB SERPL: 2.1 {RATIO} (ref 1.1–2.2)
ALP SERPL-CCNC: 58 U/L (ref 40–129)
ALT SERPL-CCNC: 17 U/L (ref 10–40)
ANION GAP SERPL CALCULATED.3IONS-SCNC: 13 MMOL/L (ref 3–16)
AST SERPL-CCNC: 13 U/L (ref 15–37)
BASOPHILS # BLD: 0 K/UL (ref 0–0.2)
BASOPHILS NFR BLD: 0.4 %
BILIRUB SERPL-MCNC: 0.4 MG/DL (ref 0–1)
BILIRUB UR QL STRIP.AUTO: NEGATIVE
BUN SERPL-MCNC: 24 MG/DL (ref 7–20)
CALCIUM SERPL-MCNC: 8.9 MG/DL (ref 8.3–10.6)
CHLORIDE SERPL-SCNC: 98 MMOL/L (ref 99–110)
CLARITY UR: CLEAR
CO2 SERPL-SCNC: 24 MMOL/L (ref 21–32)
COLOR UR: YELLOW
CREAT SERPL-MCNC: 0.9 MG/DL (ref 0.6–1.2)
DEPRECATED RDW RBC AUTO: 15.5 % (ref 12.4–15.4)
EOSINOPHIL # BLD: 0 K/UL (ref 0–0.6)
EOSINOPHIL NFR BLD: 0.2 %
FLUAV RNA UPPER RESP QL NAA+PROBE: NEGATIVE
FLUBV AG NPH QL: NEGATIVE
GFR SERPLBLD CREATININE-BSD FMLA CKD-EPI: >60 ML/MIN/{1.73_M2}
GLUCOSE SERPL-MCNC: 129 MG/DL (ref 70–99)
GLUCOSE UR STRIP.AUTO-MCNC: NEGATIVE MG/DL
HCT VFR BLD AUTO: 36.6 % (ref 36–48)
HGB BLD-MCNC: 12.5 G/DL (ref 12–16)
HGB UR QL STRIP.AUTO: NEGATIVE
KETONES UR STRIP.AUTO-MCNC: NEGATIVE MG/DL
LEUKOCYTE ESTERASE UR QL STRIP.AUTO: NEGATIVE
LIPASE SERPL-CCNC: 37 U/L (ref 13–60)
LYMPHOCYTES # BLD: 0.5 K/UL (ref 1–5.1)
LYMPHOCYTES NFR BLD: 4.9 %
MCH RBC QN AUTO: 30 PG (ref 26–34)
MCHC RBC AUTO-ENTMCNC: 34.2 G/DL (ref 31–36)
MCV RBC AUTO: 87.6 FL (ref 80–100)
MONOCYTES # BLD: 0.2 K/UL (ref 0–1.3)
MONOCYTES NFR BLD: 2.4 %
NEUTROPHILS # BLD: 8.8 K/UL (ref 1.7–7.7)
NEUTROPHILS NFR BLD: 92.1 %
NITRITE UR QL STRIP.AUTO: NEGATIVE
PH UR STRIP.AUTO: 8 [PH] (ref 5–8)
PLATELET # BLD AUTO: 294 K/UL (ref 135–450)
PMV BLD AUTO: 8 FL (ref 5–10.5)
POTASSIUM SERPL-SCNC: 4 MMOL/L (ref 3.5–5.1)
PROT SERPL-MCNC: 6.1 G/DL (ref 6.4–8.2)
PROT UR STRIP.AUTO-MCNC: NEGATIVE MG/DL
RBC # BLD AUTO: 4.18 M/UL (ref 4–5.2)
SARS-COV-2 RDRP RESP QL NAA+PROBE: NOT DETECTED
SODIUM SERPL-SCNC: 135 MMOL/L (ref 136–145)
SP GR UR STRIP.AUTO: 1.01 (ref 1–1.03)
TROPONIN, HIGH SENSITIVITY: 12 NG/L (ref 0–14)
UA COMPLETE W REFLEX CULTURE PNL UR: NORMAL
UA DIPSTICK W REFLEX MICRO PNL UR: NORMAL
URN SPEC COLLECT METH UR: NORMAL
UROBILINOGEN UR STRIP-ACNC: 0.2 E.U./DL
WBC # BLD AUTO: 9.6 K/UL (ref 4–11)

## 2023-10-27 PROCEDURE — 71045 X-RAY EXAM CHEST 1 VIEW: CPT

## 2023-10-27 PROCEDURE — 81003 URINALYSIS AUTO W/O SCOPE: CPT

## 2023-10-27 PROCEDURE — 96374 THER/PROPH/DIAG INJ IV PUSH: CPT

## 2023-10-27 PROCEDURE — 83690 ASSAY OF LIPASE: CPT

## 2023-10-27 PROCEDURE — 87804 INFLUENZA ASSAY W/OPTIC: CPT

## 2023-10-27 PROCEDURE — 6370000000 HC RX 637 (ALT 250 FOR IP): Performed by: EMERGENCY MEDICINE

## 2023-10-27 PROCEDURE — 80053 COMPREHEN METABOLIC PANEL: CPT

## 2023-10-27 PROCEDURE — 87635 SARS-COV-2 COVID-19 AMP PRB: CPT

## 2023-10-27 PROCEDURE — 2580000003 HC RX 258: Performed by: PHYSICIAN ASSISTANT

## 2023-10-27 PROCEDURE — 96375 TX/PRO/DX INJ NEW DRUG ADDON: CPT

## 2023-10-27 PROCEDURE — 84484 ASSAY OF TROPONIN QUANT: CPT

## 2023-10-27 PROCEDURE — 74177 CT ABD & PELVIS W/CONTRAST: CPT

## 2023-10-27 PROCEDURE — 6360000002 HC RX W HCPCS: Performed by: PHYSICIAN ASSISTANT

## 2023-10-27 PROCEDURE — 99285 EMERGENCY DEPT VISIT HI MDM: CPT

## 2023-10-27 PROCEDURE — 96361 HYDRATE IV INFUSION ADD-ON: CPT

## 2023-10-27 PROCEDURE — 85025 COMPLETE CBC W/AUTO DIFF WBC: CPT

## 2023-10-27 PROCEDURE — 6360000004 HC RX CONTRAST MEDICATION: Performed by: PHYSICIAN ASSISTANT

## 2023-10-27 RX ORDER — ONDANSETRON 4 MG/1
4 TABLET, ORALLY DISINTEGRATING ORAL 4 TIMES DAILY PRN
Qty: 40 TABLET | Refills: 0 | Status: SHIPPED | OUTPATIENT
Start: 2023-10-27

## 2023-10-27 RX ORDER — FAMOTIDINE 20 MG/1
20 TABLET, FILM COATED ORAL ONCE
Status: COMPLETED | OUTPATIENT
Start: 2023-10-27 | End: 2023-10-27

## 2023-10-27 RX ORDER — ONDANSETRON 2 MG/ML
4 INJECTION INTRAMUSCULAR; INTRAVENOUS ONCE
Status: COMPLETED | OUTPATIENT
Start: 2023-10-27 | End: 2023-10-27

## 2023-10-27 RX ORDER — METOCLOPRAMIDE 5 MG/1
5 TABLET ORAL 4 TIMES DAILY PRN
Qty: 60 TABLET | Refills: 1 | Status: SHIPPED | OUTPATIENT
Start: 2023-10-27

## 2023-10-27 RX ORDER — METOCLOPRAMIDE 10 MG/1
5 TABLET ORAL ONCE
Status: COMPLETED | OUTPATIENT
Start: 2023-10-27 | End: 2023-10-27

## 2023-10-27 RX ORDER — 0.9 % SODIUM CHLORIDE 0.9 %
1000 INTRAVENOUS SOLUTION INTRAVENOUS ONCE
Status: COMPLETED | OUTPATIENT
Start: 2023-10-27 | End: 2023-10-27

## 2023-10-27 RX ADMIN — FAMOTIDINE 20 MG: 20 TABLET ORAL at 18:11

## 2023-10-27 RX ADMIN — SODIUM CHLORIDE 1000 ML: 9 INJECTION, SOLUTION INTRAVENOUS at 15:15

## 2023-10-27 RX ADMIN — HYDROCORTISONE SODIUM SUCCINATE 100 MG: 100 INJECTION, POWDER, FOR SOLUTION INTRAMUSCULAR; INTRAVENOUS at 17:26

## 2023-10-27 RX ADMIN — METOCLOPRAMIDE 5 MG: 10 TABLET ORAL at 18:11

## 2023-10-27 RX ADMIN — ONDANSETRON 4 MG: 2 INJECTION INTRAMUSCULAR; INTRAVENOUS at 15:15

## 2023-10-27 RX ADMIN — IOPAMIDOL 75 ML: 755 INJECTION, SOLUTION INTRAVENOUS at 16:09

## 2023-10-27 ASSESSMENT — PAIN SCALES - GENERAL: PAINLEVEL_OUTOF10: 6

## 2023-10-27 ASSESSMENT — PAIN - FUNCTIONAL ASSESSMENT: PAIN_FUNCTIONAL_ASSESSMENT: 0-10

## 2023-10-27 NOTE — ED PROVIDER NOTES
Emergency Department Attending Provider Note  Location: [facility]  10/27/2023   Note Started: 4:44 PM EDT 10/27/23       Patient Identification  Karina Martinez is a 79 y.o. female      Jadyn Huynh was evaluated in the Emergency Department for complaining of generalized fatigue. Patient has a history of small bowel Crohn's disease. History of an ileocecectomy with ileocolic anastomosis in the past.  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 In the beginning of October she was admitted for a small bowel obstruction that resolved with conservative treatment. .  She got well enough to be discharged after 4 days. She went home on October 11. Apparently there was some delay in her getting her prednisone dose. So, she went about 30 hours between doses of prednisone. Subsequently developed a bad headache and ended up going into the emergency department. Responded to Reglan and Benadryl. Discharged on Fioricet but did not do well with that. So she has been on Reglan and Benadryl. She did see her gastroenterologist who recommended she try not to take that on a regular basis. Last night she went out for her birthday. She ate veal which apparently she usually does not eat and she thought there was something wrong with it. When she got home that night she had left lower quadrant abdominal pain and also had about 4 episodes of nausea and vomiting. She did have a bowel movement. No fever or chills. Although initial history and physical exam information was obtained by ALAINA/NPP/MD/DO (who also dictated a record of this visit), I personally saw the patient and performed a substantive portion of the visit including all aspects of the medical decision making. has a past medical history of Hyperlipidemia and Hypertension. PHYSICAL EXAM:    Physical Exam  Constitutional:       Appearance: She is well-developed. She is not diaphoretic.
10/27/2023 07:18:18 PM      PATIENT REFERRED TO:  No follow-up provider specified.     DISCHARGE MEDICATIONS:  New Prescriptions    METOCLOPRAMIDE (REGLAN) 5 MG TABLET    Take 1 tablet by mouth 4 times daily as needed (Nausea not responding to Zofran (Ondansetron))    ONDANSETRON (ZOFRAN-ODT) 4 MG DISINTEGRATING TABLET    Take 1 tablet by mouth 4 times daily as needed for Nausea or Vomiting       DISCONTINUED MEDICATIONS:  Discontinued Medications    No medications on file            (Please note that portions of this note were completed with a voice recognition program.  Efforts were made to edit the dictations but occasionally words are mis-transcribed.)    LUIS MANUEL Blake (electronically signed)        Rosie Blake  10/27/23 1925

## 2023-10-27 NOTE — DISCHARGE INSTRUCTIONS
Try the Zofran for nausea first.  If necessary take the Reglan (Metoclopramide). Take a Benadryl with the Reglan if you take it. Consider using the pediatric Benadryl Elixir ,1 teaspoon, instead of the standard 25 mg pill.

## 2023-10-28 LAB
EKG ATRIAL RATE: 48 BPM
EKG DIAGNOSIS: NORMAL
EKG P AXIS: 70 DEGREES
EKG P-R INTERVAL: 162 MS
EKG Q-T INTERVAL: 500 MS
EKG QRS DURATION: 76 MS
EKG QTC CALCULATION (BAZETT): 446 MS
EKG R AXIS: 29 DEGREES
EKG T AXIS: 71 DEGREES
EKG VENTRICULAR RATE: 48 BPM

## 2023-10-28 NOTE — ED NOTES
Discharge and education instructions reviewed. Patient verbalized understanding, teach-back successful. Patient denied questions at this time. No acute distress noted. Patient instructed to follow-up as noted - return to emergency department if symptoms worsen. Patient verbalized understanding. Discharged per EDMD with discharge instructions. Pt was assisted to curb with wheelchair then into daughters vehicle for return home.       Meenu Johnson RN  10/27/23 2015

## 2024-08-16 RX ORDER — VITAMIN B COMPLEX
1 CAPSULE ORAL DAILY
COMMUNITY

## 2024-08-16 RX ORDER — MAGNESIUM GLUCONATE 27 MG(500)
500 TABLET ORAL DAILY
COMMUNITY

## 2024-08-19 DIAGNOSIS — Z01.818 PREOP TESTING: ICD-10-CM

## 2024-08-19 LAB
APTT BLD: 28 SEC (ref 22.1–36.4)
BASOPHILS # BLD: 0.1 K/UL (ref 0–0.2)
BASOPHILS NFR BLD: 1.3 %
DEPRECATED RDW RBC AUTO: 14.8 % (ref 12.4–15.4)
EOSINOPHIL # BLD: 0.1 K/UL (ref 0–0.6)
EOSINOPHIL NFR BLD: 2 %
HCT VFR BLD AUTO: 37.4 % (ref 36–48)
HGB BLD-MCNC: 12.8 G/DL (ref 12–16)
INR PPP: 0.99 (ref 0.85–1.15)
LYMPHOCYTES # BLD: 1.3 K/UL (ref 1–5.1)
LYMPHOCYTES NFR BLD: 18.3 %
MCH RBC QN AUTO: 29.6 PG (ref 26–34)
MCHC RBC AUTO-ENTMCNC: 34.1 G/DL (ref 31–36)
MCV RBC AUTO: 86.8 FL (ref 80–100)
MONOCYTES # BLD: 0.5 K/UL (ref 0–1.3)
MONOCYTES NFR BLD: 6.8 %
NEUTROPHILS # BLD: 5 K/UL (ref 1.7–7.7)
NEUTROPHILS NFR BLD: 71.6 %
PLATELET # BLD AUTO: 342 K/UL (ref 135–450)
PMV BLD AUTO: 9.2 FL (ref 5–10.5)
PROTHROMBIN TIME: 13.3 SEC (ref 11.9–14.9)
RBC # BLD AUTO: 4.31 M/UL (ref 4–5.2)
WBC # BLD AUTO: 6.9 K/UL (ref 4–11)

## 2024-08-20 ENCOUNTER — HOSPITAL ENCOUNTER (OUTPATIENT)
Age: 84
Discharge: HOME | End: 2024-08-20
Payer: MEDICARE

## 2024-08-20 LAB
ABO + RH BLD: NORMAL
ALBUMIN SERPL-MCNC: 4.3 G/DL (ref 3.4–5)
ALBUMIN/GLOB SERPL: 1.7 {RATIO} (ref 1.1–2.2)
ALP SERPL-CCNC: 67 U/L (ref 40–129)
ALT SERPL-CCNC: 21 U/L (ref 10–40)
ANION GAP SERPL CALCULATED.3IONS-SCNC: 19 MMOL/L (ref 3–16)
AST SERPL-CCNC: 22 U/L (ref 15–37)
BILIRUB SERPL-MCNC: <0.2 MG/DL (ref 0–1)
BLD GP AB SCN SERPL QL: NORMAL
BUN SERPL-MCNC: 30 MG/DL (ref 7–20)
CALCIUM SERPL-MCNC: 9.2 MG/DL (ref 8.3–10.6)
CHLORIDE SERPL-SCNC: 105 MMOL/L (ref 99–110)
CO2 SERPL-SCNC: 21 MMOL/L (ref 21–32)
CREAT SERPL-MCNC: 0.9 MG/DL (ref 0.6–1.2)
EKG ATRIAL RATE: 57 BPM
EKG DIAGNOSIS: NORMAL
EKG P AXIS: 10 DEGREES
EKG P-R INTERVAL: 150 MS
EKG Q-T INTERVAL: 448 MS
EKG QRS DURATION: 74 MS
EKG QTC CALCULATION (BAZETT): 436 MS
EKG R AXIS: 41 DEGREES
EKG T AXIS: 60 DEGREES
EKG VENTRICULAR RATE: 57 BPM
EST. AVERAGE GLUCOSE BLD GHB EST-MCNC: 108.3 MG/DL
GFR SERPLBLD CREATININE-BSD FMLA CKD-EPI: 63 ML/MIN/{1.73_M2}
GLUCOSE SERPL-MCNC: 123 MG/DL (ref 70–99)
HBA1C MFR BLD: 5.4 %
POTASSIUM SERPL-SCNC: 4.9 MMOL/L (ref 3.5–5.1)
PROT SERPL-MCNC: 6.8 G/DL (ref 6.4–8.2)
SODIUM SERPL-SCNC: 145 MMOL/L (ref 136–145)

## 2024-08-20 PROCEDURE — 93010 ELECTROCARDIOGRAM REPORT: CPT | Performed by: INTERNAL MEDICINE

## 2024-08-20 PROCEDURE — 93005 ELECTROCARDIOGRAM TRACING: CPT | Performed by: ORTHOPAEDIC SURGERY

## 2024-08-21 LAB — MRSA SPEC QL CULT: NORMAL

## 2024-09-11 ENCOUNTER — ANESTHESIA EVENT (OUTPATIENT)
Dept: OPERATING ROOM | Age: 84
End: 2024-09-11
Payer: MEDICARE

## 2024-09-11 RX ORDER — TRANEXAMIC ACID 10 MG/ML
1000 INJECTION, SOLUTION INTRAVENOUS ONCE
Status: COMPLETED | OUTPATIENT
Start: 2024-09-12 | End: 2024-09-12

## 2024-09-11 RX ORDER — TRANEXAMIC ACID 10 MG/ML
1000 INJECTION, SOLUTION INTRAVENOUS
Status: COMPLETED | OUTPATIENT
Start: 2024-09-12 | End: 2024-09-12

## 2024-09-11 RX ORDER — DEXAMETHASONE SODIUM PHOSPHATE 10 MG/ML
10 INJECTION, SOLUTION INTRAMUSCULAR; INTRAVENOUS ONCE
Status: COMPLETED | OUTPATIENT
Start: 2024-09-12 | End: 2024-09-12

## 2024-09-12 ENCOUNTER — ANESTHESIA (OUTPATIENT)
Dept: OPERATING ROOM | Age: 84
End: 2024-09-12
Payer: MEDICARE

## 2024-09-12 ENCOUNTER — HOSPITAL ENCOUNTER (OUTPATIENT)
Age: 84
Discharge: HOME | End: 2024-09-12
Attending: ORTHOPAEDIC SURGERY | Admitting: ORTHOPAEDIC SURGERY
Payer: MEDICARE

## 2024-09-12 ENCOUNTER — APPOINTMENT (OUTPATIENT)
Dept: GENERAL RADIOLOGY | Age: 84
End: 2024-09-12
Attending: ORTHOPAEDIC SURGERY
Payer: MEDICARE

## 2024-09-12 VITALS
HEIGHT: 60 IN | DIASTOLIC BLOOD PRESSURE: 66 MMHG | HEART RATE: 71 BPM | BODY MASS INDEX: 25.72 KG/M2 | RESPIRATION RATE: 12 BRPM | SYSTOLIC BLOOD PRESSURE: 139 MMHG | TEMPERATURE: 97.6 F | WEIGHT: 131 LBS | OXYGEN SATURATION: 95 %

## 2024-09-12 DIAGNOSIS — M17.11 ARTHRITIS OF RIGHT KNEE: ICD-10-CM

## 2024-09-12 DIAGNOSIS — Z01.818 PREOP TESTING: Primary | ICD-10-CM

## 2024-09-12 LAB
ABO + RH BLD: NORMAL
BLD GP AB SCN SERPL QL: NORMAL
GLUCOSE BLD-MCNC: 90 MG/DL (ref 70–99)
PERFORMED ON: NORMAL

## 2024-09-12 PROCEDURE — 97116 GAIT TRAINING THERAPY: CPT

## 2024-09-12 PROCEDURE — 6360000002 HC RX W HCPCS: Performed by: STUDENT IN AN ORGANIZED HEALTH CARE EDUCATION/TRAINING PROGRAM

## 2024-09-12 PROCEDURE — 6360000002 HC RX W HCPCS: Performed by: ANESTHESIOLOGY

## 2024-09-12 PROCEDURE — 7100000000 HC PACU RECOVERY - FIRST 15 MIN: Performed by: ORTHOPAEDIC SURGERY

## 2024-09-12 PROCEDURE — 2720000010 HC SURG SUPPLY STERILE: Performed by: ORTHOPAEDIC SURGERY

## 2024-09-12 PROCEDURE — 73560 X-RAY EXAM OF KNEE 1 OR 2: CPT

## 2024-09-12 PROCEDURE — 36415 COLL VENOUS BLD VENIPUNCTURE: CPT

## 2024-09-12 PROCEDURE — 2500000003 HC RX 250 WO HCPCS: Performed by: ORTHOPAEDIC SURGERY

## 2024-09-12 PROCEDURE — 2709999900 HC NON-CHARGEABLE SUPPLY: Performed by: ORTHOPAEDIC SURGERY

## 2024-09-12 PROCEDURE — 97530 THERAPEUTIC ACTIVITIES: CPT

## 2024-09-12 PROCEDURE — 3600000014 HC SURGERY LEVEL 4 ADDTL 15MIN: Performed by: ORTHOPAEDIC SURGERY

## 2024-09-12 PROCEDURE — 2500000003 HC RX 250 WO HCPCS

## 2024-09-12 PROCEDURE — 6360000002 HC RX W HCPCS: Performed by: ORTHOPAEDIC SURGERY

## 2024-09-12 PROCEDURE — 97161 PT EVAL LOW COMPLEX 20 MIN: CPT

## 2024-09-12 PROCEDURE — 6370000000 HC RX 637 (ALT 250 FOR IP): Performed by: STUDENT IN AN ORGANIZED HEALTH CARE EDUCATION/TRAINING PROGRAM

## 2024-09-12 PROCEDURE — 6360000002 HC RX W HCPCS

## 2024-09-12 PROCEDURE — 7100000001 HC PACU RECOVERY - ADDTL 15 MIN: Performed by: ORTHOPAEDIC SURGERY

## 2024-09-12 PROCEDURE — C1713 ANCHOR/SCREW BN/BN,TIS/BN: HCPCS | Performed by: ORTHOPAEDIC SURGERY

## 2024-09-12 PROCEDURE — C1776 JOINT DEVICE (IMPLANTABLE): HCPCS | Performed by: ORTHOPAEDIC SURGERY

## 2024-09-12 PROCEDURE — 2580000003 HC RX 258: Performed by: ORTHOPAEDIC SURGERY

## 2024-09-12 PROCEDURE — 86850 RBC ANTIBODY SCREEN: CPT

## 2024-09-12 PROCEDURE — 97165 OT EVAL LOW COMPLEX 30 MIN: CPT

## 2024-09-12 PROCEDURE — 3600000004 HC SURGERY LEVEL 4 BASE: Performed by: ORTHOPAEDIC SURGERY

## 2024-09-12 PROCEDURE — 7100000011 HC PHASE II RECOVERY - ADDTL 15 MIN: Performed by: ORTHOPAEDIC SURGERY

## 2024-09-12 PROCEDURE — 86901 BLOOD TYPING SEROLOGIC RH(D): CPT

## 2024-09-12 PROCEDURE — 97535 SELF CARE MNGMENT TRAINING: CPT

## 2024-09-12 PROCEDURE — 6370000000 HC RX 637 (ALT 250 FOR IP): Performed by: ORTHOPAEDIC SURGERY

## 2024-09-12 PROCEDURE — 3700000001 HC ADD 15 MINUTES (ANESTHESIA): Performed by: ORTHOPAEDIC SURGERY

## 2024-09-12 PROCEDURE — 7100000010 HC PHASE II RECOVERY - FIRST 15 MIN: Performed by: ORTHOPAEDIC SURGERY

## 2024-09-12 PROCEDURE — 86900 BLOOD TYPING SEROLOGIC ABO: CPT

## 2024-09-12 PROCEDURE — 3700000000 HC ANESTHESIA ATTENDED CARE: Performed by: ORTHOPAEDIC SURGERY

## 2024-09-12 PROCEDURE — 64447 NJX AA&/STRD FEMORAL NRV IMG: CPT | Performed by: ANESTHESIOLOGY

## 2024-09-12 DEVICE — PSN MC VE ASF R 10MM 6-7/EF: Type: IMPLANTABLE DEVICE | Site: KNEE | Status: FUNCTIONAL

## 2024-09-12 DEVICE — CEMENT BNE 40GM HI VISC RADPQ FOR REV SURG: Type: IMPLANTABLE DEVICE | Site: KNEE | Status: FUNCTIONAL

## 2024-09-12 DEVICE — PSN TIB STM 5 DEG SZ E R: Type: IMPLANTABLE DEVICE | Site: KNEE | Status: FUNCTIONAL

## 2024-09-12 DEVICE — COMPONENT PAT DIA32MM THK8.5MM STD KNEE VIVACIT-E CEM: Type: IMPLANTABLE DEVICE | Site: KNEE | Status: FUNCTIONAL

## 2024-09-12 DEVICE — IMPLANTABLE DEVICE: Type: IMPLANTABLE DEVICE | Site: KNEE | Status: FUNCTIONAL

## 2024-09-12 RX ORDER — CLINDAMYCIN HCL 300 MG
300 CAPSULE ORAL 3 TIMES DAILY
Qty: 21 CAPSULE | Refills: 0 | Status: SHIPPED | OUTPATIENT
Start: 2024-09-12 | End: 2024-09-19

## 2024-09-12 RX ORDER — HYDROMORPHONE HYDROCHLORIDE 2 MG/ML
0.25 INJECTION, SOLUTION INTRAMUSCULAR; INTRAVENOUS; SUBCUTANEOUS
Status: CANCELLED | OUTPATIENT
Start: 2024-09-12

## 2024-09-12 RX ORDER — OXYCODONE HYDROCHLORIDE 5 MG/1
5 TABLET ORAL EVERY 6 HOURS PRN
Qty: 28 TABLET | Refills: 0 | Status: SHIPPED | OUTPATIENT
Start: 2024-09-12 | End: 2024-09-19

## 2024-09-12 RX ORDER — SODIUM CHLORIDE 0.9 % (FLUSH) 0.9 %
5-40 SYRINGE (ML) INJECTION EVERY 12 HOURS SCHEDULED
Status: DISCONTINUED | OUTPATIENT
Start: 2024-09-12 | End: 2024-09-12 | Stop reason: HOSPADM

## 2024-09-12 RX ORDER — ASPIRIN 81 MG/1
81 TABLET ORAL DAILY
Status: CANCELLED | OUTPATIENT
Start: 2024-09-12

## 2024-09-12 RX ORDER — LIDOCAINE HYDROCHLORIDE 10 MG/ML
0.5 INJECTION, SOLUTION EPIDURAL; INFILTRATION; INTRACAUDAL; PERINEURAL ONCE
Status: DISCONTINUED | OUTPATIENT
Start: 2024-09-12 | End: 2024-09-12 | Stop reason: HOSPADM

## 2024-09-12 RX ORDER — ATORVASTATIN CALCIUM 10 MG/1
10 TABLET, FILM COATED ORAL DAILY
Status: DISCONTINUED | OUTPATIENT
Start: 2024-09-12 | End: 2024-09-12 | Stop reason: HOSPADM

## 2024-09-12 RX ORDER — SODIUM CHLORIDE 0.9 % (FLUSH) 0.9 %
5-40 SYRINGE (ML) INJECTION PRN
Status: CANCELLED | OUTPATIENT
Start: 2024-09-12

## 2024-09-12 RX ORDER — SODIUM CHLORIDE 9 MG/ML
INJECTION, SOLUTION INTRAVENOUS PRN
Status: DISCONTINUED | OUTPATIENT
Start: 2024-09-12 | End: 2024-09-12 | Stop reason: HOSPADM

## 2024-09-12 RX ORDER — HALOPERIDOL 5 MG/ML
1 INJECTION INTRAMUSCULAR
Status: DISCONTINUED | OUTPATIENT
Start: 2024-09-12 | End: 2024-09-12 | Stop reason: HOSPADM

## 2024-09-12 RX ORDER — SODIUM CHLORIDE 9 MG/ML
INJECTION, SOLUTION INTRAVENOUS CONTINUOUS
Status: CANCELLED | OUTPATIENT
Start: 2024-09-12

## 2024-09-12 RX ORDER — OXYCODONE HYDROCHLORIDE 5 MG/1
10 TABLET ORAL PRN
Status: DISCONTINUED | OUTPATIENT
Start: 2024-09-12 | End: 2024-09-12 | Stop reason: HOSPADM

## 2024-09-12 RX ORDER — PROPOFOL 10 MG/ML
INJECTION, EMULSION INTRAVENOUS
Status: DISCONTINUED | OUTPATIENT
Start: 2024-09-12 | End: 2024-09-12 | Stop reason: SDUPTHER

## 2024-09-12 RX ORDER — PANTOPRAZOLE SODIUM 40 MG/1
40 TABLET, DELAYED RELEASE ORAL
Status: DISCONTINUED | OUTPATIENT
Start: 2024-09-13 | End: 2024-09-12 | Stop reason: HOSPADM

## 2024-09-12 RX ORDER — ACETAMINOPHEN 500 MG
1000 TABLET ORAL EVERY 8 HOURS SCHEDULED
Status: CANCELLED | OUTPATIENT
Start: 2024-09-12

## 2024-09-12 RX ORDER — OXYCODONE HYDROCHLORIDE 5 MG/1
5 TABLET ORAL EVERY 4 HOURS PRN
Status: CANCELLED | OUTPATIENT
Start: 2024-09-12

## 2024-09-12 RX ORDER — SODIUM CHLORIDE, SODIUM LACTATE, POTASSIUM CHLORIDE, CALCIUM CHLORIDE 600; 310; 30; 20 MG/100ML; MG/100ML; MG/100ML; MG/100ML
INJECTION, SOLUTION INTRAVENOUS CONTINUOUS
Status: DISCONTINUED | OUTPATIENT
Start: 2024-09-12 | End: 2024-09-12 | Stop reason: HOSPADM

## 2024-09-12 RX ORDER — SODIUM CHLORIDE 0.9 % (FLUSH) 0.9 %
5-40 SYRINGE (ML) INJECTION EVERY 12 HOURS SCHEDULED
Status: CANCELLED | OUTPATIENT
Start: 2024-09-12

## 2024-09-12 RX ORDER — GLYCOPYRROLATE 0.2 MG/ML
INJECTION INTRAMUSCULAR; INTRAVENOUS
Status: DISCONTINUED | OUTPATIENT
Start: 2024-09-12 | End: 2024-09-12 | Stop reason: SDUPTHER

## 2024-09-12 RX ORDER — LEVOTHYROXINE SODIUM 75 UG/1
75 TABLET ORAL DAILY
Status: DISCONTINUED | OUTPATIENT
Start: 2024-09-12 | End: 2024-09-12 | Stop reason: HOSPADM

## 2024-09-12 RX ORDER — SODIUM CHLORIDE 9 MG/ML
INJECTION, SOLUTION INTRAVENOUS CONTINUOUS
Status: DISCONTINUED | OUTPATIENT
Start: 2024-09-12 | End: 2024-09-12 | Stop reason: HOSPADM

## 2024-09-12 RX ORDER — MIDAZOLAM HYDROCHLORIDE 2 MG/2ML
2 INJECTION, SOLUTION INTRAMUSCULAR; INTRAVENOUS
Status: COMPLETED | OUTPATIENT
Start: 2024-09-12 | End: 2024-09-12

## 2024-09-12 RX ORDER — BUPIVACAINE HYDROCHLORIDE 5 MG/ML
INJECTION, SOLUTION EPIDURAL; INTRACAUDAL
Status: COMPLETED | OUTPATIENT
Start: 2024-09-12 | End: 2024-09-12

## 2024-09-12 RX ORDER — HYDROMORPHONE HYDROCHLORIDE 2 MG/ML
0.5 INJECTION, SOLUTION INTRAMUSCULAR; INTRAVENOUS; SUBCUTANEOUS
Status: CANCELLED | OUTPATIENT
Start: 2024-09-12

## 2024-09-12 RX ORDER — OXYCODONE HYDROCHLORIDE 5 MG/1
10 TABLET ORAL EVERY 4 HOURS PRN
Status: CANCELLED | OUTPATIENT
Start: 2024-09-12

## 2024-09-12 RX ORDER — ONDANSETRON 2 MG/ML
INJECTION INTRAMUSCULAR; INTRAVENOUS
Status: DISCONTINUED | OUTPATIENT
Start: 2024-09-12 | End: 2024-09-12 | Stop reason: SDUPTHER

## 2024-09-12 RX ORDER — HYDRALAZINE HYDROCHLORIDE 20 MG/ML
10 INJECTION INTRAMUSCULAR; INTRAVENOUS
Status: DISCONTINUED | OUTPATIENT
Start: 2024-09-12 | End: 2024-09-12 | Stop reason: HOSPADM

## 2024-09-12 RX ORDER — LABETALOL HYDROCHLORIDE 5 MG/ML
10 INJECTION, SOLUTION INTRAVENOUS
Status: DISCONTINUED | OUTPATIENT
Start: 2024-09-12 | End: 2024-09-12 | Stop reason: HOSPADM

## 2024-09-12 RX ORDER — VITAMIN B COMPLEX
1 CAPSULE ORAL DAILY
Status: DISCONTINUED | OUTPATIENT
Start: 2024-09-12 | End: 2024-09-12 | Stop reason: HOSPADM

## 2024-09-12 RX ORDER — ACETAMINOPHEN 500 MG
1000 TABLET ORAL ONCE
Status: COMPLETED | OUTPATIENT
Start: 2024-09-12 | End: 2024-09-12

## 2024-09-12 RX ORDER — SODIUM CHLORIDE 9 MG/ML
INJECTION, SOLUTION INTRAVENOUS PRN
Status: CANCELLED | OUTPATIENT
Start: 2024-09-12

## 2024-09-12 RX ORDER — OXYCODONE HCL 10 MG/1
10 TABLET, FILM COATED, EXTENDED RELEASE ORAL ONCE
Status: COMPLETED | OUTPATIENT
Start: 2024-09-12 | End: 2024-09-12

## 2024-09-12 RX ORDER — LIDOCAINE HYDROCHLORIDE 20 MG/ML
INJECTION, SOLUTION EPIDURAL; INFILTRATION; INTRACAUDAL; PERINEURAL
Status: DISCONTINUED | OUTPATIENT
Start: 2024-09-12 | End: 2024-09-12 | Stop reason: SDUPTHER

## 2024-09-12 RX ORDER — IPRATROPIUM BROMIDE AND ALBUTEROL SULFATE 2.5; .5 MG/3ML; MG/3ML
1 SOLUTION RESPIRATORY (INHALATION)
Status: DISCONTINUED | OUTPATIENT
Start: 2024-09-12 | End: 2024-09-12 | Stop reason: HOSPADM

## 2024-09-12 RX ORDER — SODIUM CHLORIDE 0.9 % (FLUSH) 0.9 %
5-40 SYRINGE (ML) INJECTION PRN
Status: DISCONTINUED | OUTPATIENT
Start: 2024-09-12 | End: 2024-09-12 | Stop reason: HOSPADM

## 2024-09-12 RX ORDER — HYDROMORPHONE HYDROCHLORIDE 2 MG/ML
0.25 INJECTION, SOLUTION INTRAMUSCULAR; INTRAVENOUS; SUBCUTANEOUS EVERY 5 MIN PRN
Status: DISCONTINUED | OUTPATIENT
Start: 2024-09-12 | End: 2024-09-12 | Stop reason: HOSPADM

## 2024-09-12 RX ORDER — ACETAMINOPHEN 500 MG
1000 TABLET ORAL 3 TIMES DAILY
COMMUNITY
Start: 2024-09-12

## 2024-09-12 RX ORDER — LOSARTAN POTASSIUM 25 MG/1
25 TABLET ORAL DAILY
Status: DISCONTINUED | OUTPATIENT
Start: 2024-09-12 | End: 2024-09-12 | Stop reason: HOSPADM

## 2024-09-12 RX ORDER — ONDANSETRON 4 MG/1
4 TABLET, ORALLY DISINTEGRATING ORAL 4 TIMES DAILY PRN
Status: DISCONTINUED | OUTPATIENT
Start: 2024-09-12 | End: 2024-09-12 | Stop reason: HOSPADM

## 2024-09-12 RX ORDER — OXYCODONE HYDROCHLORIDE 5 MG/1
5 TABLET ORAL PRN
Status: DISCONTINUED | OUTPATIENT
Start: 2024-09-12 | End: 2024-09-12 | Stop reason: HOSPADM

## 2024-09-12 RX ORDER — VANCOMYCIN HYDROCHLORIDE 1 G/20ML
INJECTION, POWDER, LYOPHILIZED, FOR SOLUTION INTRAVENOUS
Status: COMPLETED | OUTPATIENT
Start: 2024-09-12 | End: 2024-09-12

## 2024-09-12 RX ORDER — HYDROMORPHONE HYDROCHLORIDE 2 MG/ML
0.5 INJECTION, SOLUTION INTRAMUSCULAR; INTRAVENOUS; SUBCUTANEOUS EVERY 5 MIN PRN
Status: DISCONTINUED | OUTPATIENT
Start: 2024-09-12 | End: 2024-09-12 | Stop reason: HOSPADM

## 2024-09-12 RX ORDER — DIPHENHYDRAMINE HYDROCHLORIDE 50 MG/ML
12.5 INJECTION INTRAMUSCULAR; INTRAVENOUS
Status: DISCONTINUED | OUTPATIENT
Start: 2024-09-12 | End: 2024-09-12 | Stop reason: HOSPADM

## 2024-09-12 RX ORDER — ASPIRIN 81 MG/1
81 TABLET ORAL DAILY
Qty: 30 TABLET | Refills: 0 | Status: SHIPPED | OUTPATIENT
Start: 2024-09-12

## 2024-09-12 RX ORDER — DOCUSATE SODIUM 100 MG/1
100 CAPSULE, LIQUID FILLED ORAL 2 TIMES DAILY
COMMUNITY
Start: 2024-09-12

## 2024-09-12 RX ORDER — BISACODYL 10 MG
10 SUPPOSITORY, RECTAL RECTAL DAILY PRN
Status: CANCELLED | OUTPATIENT
Start: 2024-09-12

## 2024-09-12 RX ORDER — NALOXONE HYDROCHLORIDE 0.4 MG/ML
INJECTION, SOLUTION INTRAMUSCULAR; INTRAVENOUS; SUBCUTANEOUS PRN
Status: DISCONTINUED | OUTPATIENT
Start: 2024-09-12 | End: 2024-09-12 | Stop reason: HOSPADM

## 2024-09-12 RX ORDER — MAGNESIUM HYDROXIDE 1200 MG/15ML
LIQUID ORAL CONTINUOUS PRN
Status: COMPLETED | OUTPATIENT
Start: 2024-09-12 | End: 2024-09-12

## 2024-09-12 RX ORDER — CELECOXIB 200 MG/1
200 CAPSULE ORAL 2 TIMES DAILY
Status: DISCONTINUED | OUTPATIENT
Start: 2024-09-12 | End: 2024-09-12 | Stop reason: HOSPADM

## 2024-09-12 RX ORDER — ONDANSETRON 2 MG/ML
4 INJECTION INTRAMUSCULAR; INTRAVENOUS
Status: COMPLETED | OUTPATIENT
Start: 2024-09-12 | End: 2024-09-12

## 2024-09-12 RX ADMIN — LIDOCAINE HYDROCHLORIDE 100 MG: 20 INJECTION, SOLUTION EPIDURAL; INFILTRATION; INTRACAUDAL; PERINEURAL at 10:35

## 2024-09-12 RX ADMIN — OXYCODONE HYDROCHLORIDE 10 MG: 10 TABLET, FILM COATED, EXTENDED RELEASE ORAL at 08:59

## 2024-09-12 RX ADMIN — PHENYLEPHRINE HYDROCHLORIDE 50 MCG: 10 INJECTION INTRAVENOUS at 11:26

## 2024-09-12 RX ADMIN — PROPOFOL 30 MG: 10 INJECTION, EMULSION INTRAVENOUS at 10:35

## 2024-09-12 RX ADMIN — PHENYLEPHRINE HYDROCHLORIDE 50 MCG: 10 INJECTION INTRAVENOUS at 11:39

## 2024-09-12 RX ADMIN — ACETAMINOPHEN 1000 MG: 500 TABLET ORAL at 08:59

## 2024-09-12 RX ADMIN — BUPIVACAINE HYDROCHLORIDE 20 ML: 5 INJECTION, SOLUTION EPIDURAL; INTRACAUDAL at 09:24

## 2024-09-12 RX ADMIN — HYDROMORPHONE HYDROCHLORIDE 0.25 MG: 2 INJECTION, SOLUTION INTRAMUSCULAR; INTRAVENOUS; SUBCUTANEOUS at 14:02

## 2024-09-12 RX ADMIN — TRANEXAMIC ACID 1000 MG: 10 INJECTION, SOLUTION INTRAVENOUS at 10:49

## 2024-09-12 RX ADMIN — DEXAMETHASONE SODIUM PHOSPHATE 10 MG: 10 INJECTION INTRAMUSCULAR; INTRAVENOUS at 09:01

## 2024-09-12 RX ADMIN — ONDANSETRON 4 MG: 2 INJECTION INTRAMUSCULAR; INTRAVENOUS at 10:49

## 2024-09-12 RX ADMIN — SODIUM CHLORIDE: 9 INJECTION, SOLUTION INTRAVENOUS at 09:03

## 2024-09-12 RX ADMIN — PHENYLEPHRINE HYDROCHLORIDE 50 MCG: 10 INJECTION INTRAVENOUS at 11:32

## 2024-09-12 RX ADMIN — MIDAZOLAM 0.5 MG: 1 INJECTION INTRAMUSCULAR; INTRAVENOUS at 09:25

## 2024-09-12 RX ADMIN — GLYCOPYRROLATE 0.2 MG: 0.2 INJECTION, SOLUTION INTRAMUSCULAR; INTRAVENOUS at 10:37

## 2024-09-12 RX ADMIN — VANCOMYCIN HYDROCHLORIDE 1000 MG: 1 INJECTION, POWDER, LYOPHILIZED, FOR SOLUTION INTRAVENOUS at 09:06

## 2024-09-12 RX ADMIN — ONDANSETRON 4 MG: 2 INJECTION INTRAMUSCULAR; INTRAVENOUS at 15:50

## 2024-09-12 RX ADMIN — PHENYLEPHRINE HYDROCHLORIDE 50 MCG: 10 INJECTION INTRAVENOUS at 11:20

## 2024-09-12 RX ADMIN — HYDROMORPHONE HYDROCHLORIDE 0.25 MG: 2 INJECTION, SOLUTION INTRAMUSCULAR; INTRAVENOUS; SUBCUTANEOUS at 13:18

## 2024-09-12 RX ADMIN — PROPOFOL 120 MCG/KG/MIN: 10 INJECTION, EMULSION INTRAVENOUS at 10:36

## 2024-09-12 RX ADMIN — PHENYLEPHRINE HYDROCHLORIDE 50 MCG: 10 INJECTION INTRAVENOUS at 11:05

## 2024-09-12 RX ADMIN — PHENYLEPHRINE HYDROCHLORIDE 50 MCG: 10 INJECTION INTRAVENOUS at 11:14

## 2024-09-12 RX ADMIN — MEPIVACAINE HYDROCHLORIDE 50 MG: 20 INJECTION, SOLUTION EPIDURAL; INFILTRATION at 10:36

## 2024-09-12 RX ADMIN — TRANEXAMIC ACID 1000 MG: 10 INJECTION, SOLUTION INTRAVENOUS at 11:00

## 2024-09-12 ASSESSMENT — PAIN - FUNCTIONAL ASSESSMENT
PAIN_FUNCTIONAL_ASSESSMENT: 0-10
PAIN_FUNCTIONAL_ASSESSMENT: 0-10
PAIN_FUNCTIONAL_ASSESSMENT: PREVENTS OR INTERFERES SOME ACTIVE ACTIVITIES AND ADLS
PAIN_FUNCTIONAL_ASSESSMENT: PREVENTS OR INTERFERES SOME ACTIVE ACTIVITIES AND ADLS

## 2024-09-12 ASSESSMENT — PAIN DESCRIPTION - DESCRIPTORS
DESCRIPTORS: DISCOMFORT;BURNING
DESCRIPTORS: DISCOMFORT

## 2024-09-12 ASSESSMENT — PAIN SCALES - GENERAL
PAINLEVEL_OUTOF10: 8
PAINLEVEL_OUTOF10: 6
PAINLEVEL_OUTOF10: 0

## 2024-09-12 ASSESSMENT — PAIN DESCRIPTION - ORIENTATION
ORIENTATION: RIGHT
ORIENTATION: RIGHT

## 2024-09-12 ASSESSMENT — PAIN DESCRIPTION - LOCATION
LOCATION: KNEE
LOCATION: KNEE

## 2024-09-12 ASSESSMENT — ENCOUNTER SYMPTOMS: SHORTNESS OF BREATH: 1

## 2024-09-13 ENCOUNTER — TELEPHONE (OUTPATIENT)
Dept: ORTHOPEDIC SURGERY | Age: 84
End: 2024-09-13

## 2024-09-13 NOTE — TELEPHONE ENCOUNTER
Called patient for [facility] ortho bundle follow up.  Unable to reach patient, left voicemail.  Instructed patient to call for any questions or concerns.      Nai Rogers RN  [phone]

## 2024-09-16 ENCOUNTER — TELEPHONE (OUTPATIENT)
Dept: ORTHOPEDIC SURGERY | Age: 84
End: 2024-09-16

## 2024-09-20 ENCOUNTER — TELEPHONE (OUTPATIENT)
Dept: ORTHOPEDIC SURGERY | Age: 84
End: 2024-09-20

## 2024-09-25 ENCOUNTER — TELEPHONE (OUTPATIENT)
Dept: ORTHOPEDIC SURGERY | Age: 84
End: 2024-09-25

## 2024-10-11 ENCOUNTER — TELEPHONE (OUTPATIENT)
Dept: ORTHOPEDIC SURGERY | Age: 84
End: 2024-10-11

## 2024-10-11 NOTE — TELEPHONE ENCOUNTER
Called patient for [facility] ortho bundle follow up.  Unable to reach patient, left voicemail.  Instructed patient to call for any questions or concerns.      Nai Rogers RN  [phone]

## 2024-11-15 ENCOUNTER — TELEPHONE (OUTPATIENT)
Dept: ORTHOPEDIC SURGERY | Age: 84
End: 2024-11-15

## 2024-11-15 NOTE — TELEPHONE ENCOUNTER
S/P R TKA with Dr. Rosado    Spoke with Myla     Incision status: Looking better    Edema/Swelling: some swelling and getting better, PT helped with massage    Blood Thinner: No    Pain level and status: 4/10    Use of pain medications: Tylenol for pain    Bowels: pretty much    Home Care Agency active: Home aide     Outpatient therapy: Pt comes to the home    Do you have all of your medications: Yes    Changes in medications: None    Patient was advised to call with any questions or concerns.     Follow up appointments:    No future appointments.

## 2024-12-13 ENCOUNTER — TELEPHONE (OUTPATIENT)
Dept: ORTHOPEDIC SURGERY | Age: 84
End: 2024-12-13

## 2024-12-13 NOTE — TELEPHONE ENCOUNTER
S/P R TKA with Dr. Rosado    Spoke with Myla    Incision status: good    Edema/Swelling: \"little bit not a lot\"    Blood Thinner: none    Pain level and status: always a little     Use of pain medications: tylenol    Bowels: yes     Home Care Agency active: No    Outpatient therapy: Yes    Do you have all of your medications: yes    Changes in medications: none    Patient was advised to call with any questions or concerns.     Follow up appointments:    No future appointments.

## 2025-02-19 LAB
ALBUMIN SERPL-MCNC: 4.4 G/DL (ref 3.4–5)
ALBUMIN/GLOB SERPL: 2 {RATIO} (ref 1.1–2.2)
ALP SERPL-CCNC: 60 U/L (ref 40–129)
ALT SERPL-CCNC: 21 U/L (ref 10–40)
ANION GAP SERPL CALCULATED.3IONS-SCNC: 8 MMOL/L (ref 3–16)
AST SERPL-CCNC: 19 U/L (ref 15–37)
BILIRUB SERPL-MCNC: <0.2 MG/DL (ref 0–1)
BUN SERPL-MCNC: 28 MG/DL (ref 7–20)
CALCIUM SERPL-MCNC: 9.5 MG/DL (ref 8.3–10.6)
CHLORIDE SERPL-SCNC: 103 MMOL/L (ref 99–110)
CO2 SERPL-SCNC: 27 MMOL/L (ref 21–32)
CREAT SERPL-MCNC: 0.8 MG/DL (ref 0.6–1.2)
DEPRECATED RDW RBC AUTO: 15.6 % (ref 12.4–15.4)
GFR SERPLBLD CREATININE-BSD FMLA CKD-EPI: 72 ML/MIN/{1.73_M2}
GLUCOSE SERPL-MCNC: 87 MG/DL (ref 70–99)
HCT VFR BLD AUTO: 36.7 % (ref 36–48)
HGB BLD-MCNC: 12.1 G/DL (ref 12–16)
MCH RBC QN AUTO: 29.1 PG (ref 26–34)
MCHC RBC AUTO-ENTMCNC: 32.9 G/DL (ref 31–36)
MCV RBC AUTO: 88.2 FL (ref 80–100)
PLATELET # BLD AUTO: 310 K/UL (ref 135–450)
PMV BLD AUTO: 8.8 FL (ref 5–10.5)
POTASSIUM SERPL-SCNC: 5.4 MMOL/L (ref 3.5–5.1)
PROT SERPL-MCNC: 6.6 G/DL (ref 6.4–8.2)
RBC # BLD AUTO: 4.15 M/UL (ref 4–5.2)
SODIUM SERPL-SCNC: 138 MMOL/L (ref 136–145)
WBC # BLD AUTO: 7.5 K/UL (ref 4–11)

## 2025-02-20 LAB — POTASSIUM SERPL-SCNC: 4.5 MMOL/L (ref 3.5–5.1)

## (undated) DEVICE — SUTURE VICRYL + SZ 0 L18IN ABSRB UD L36MM CT-1 1/2 CIR VCP840D

## (undated) DEVICE — BLADE CLIPPER GEN PURP NS

## (undated) DEVICE — SUTURE MONOCRYL + SZ 3-0 L27IN ABSRB UD PS1 L24MM 3/8 CIR REV MCP936H

## (undated) DEVICE — CONTAINER,SPECIMEN,OR STERILE,4OZ: Brand: MEDLINE

## (undated) DEVICE — HOOD: Brand: FLYTE

## (undated) DEVICE — SUTURE VICRYL + SZ 1  18IN CT CR ABSRB VCP753D

## (undated) DEVICE — 450 ML BOTTLE OF 0.05% CHLORHEXIDINE GLUCONATE IN 99.95% STERILE WATER FOR IRRIGATION, USP AND APPLICATOR.: Brand: IRRISEPT ANTIMICROBIAL WOUND LAVAGE

## (undated) DEVICE — TOTAL KNEE: Brand: MEDLINE INDUSTRIES, INC.

## (undated) DEVICE — SCREW BNE L25MM DIA2.5MM KNEE FULL THRD HEX FEM PERSONA (NOT IMPLANTED)

## (undated) DEVICE — ADHESIVE SKIN CLOSURE WND 8.661X1.5 IN 22 CM LIQUIBAND SECUR

## (undated) DEVICE — APPLICATOR PREP 26ML 0.7% IOD POVACRYLEX 74% ISO ALC ST

## (undated) DEVICE — BANDAGE COMPR W6INXL10YD ST M E WHITE/BEIGE

## (undated) DEVICE — SUTURE VICRYL + SZ 2-0 L18IN ABSRB UD CT1 L36MM 1/2 CIR VCP839D

## (undated) DEVICE — KNEE HOLDER DISPOSABLE LINER: Brand: ALVARADO®  KNEE SUPPORT

## (undated) DEVICE — STRYKER PERFORMANCE SERIES SAGITTAL BLADE: Brand: STRYKER PERFORMANCE SERIES

## (undated) DEVICE — SYRINGE MED 10ML TRNSLUC BRL PLUNG BLK MRK POLYPR CTRL

## (undated) DEVICE — DUAL CUT SAGITTAL BLADE

## (undated) DEVICE — SCREW BNE HD 3.5X48 MM HEX PERSONA (NOT IMPLANTED)

## (undated) DEVICE — ELECTRODE ECG MONITR FOAM TEAR DROP ADLT RED

## (undated) DEVICE — SHEET,DRAPE,53X77,STERILE: Brand: MEDLINE

## (undated) DEVICE — Device

## (undated) DEVICE — SOLUTION WND IRRIGATION 450 ML 0.5 PVP-I 0.9 NACL

## (undated) DEVICE — PIN HOLDING HDLSS 3.2X75 MM TROCAR ZUK

## (undated) DEVICE — 3 BONE CEMENT MIXER: Brand: MIXEVAC

## (undated) DEVICE — ENDOSCOPIC KIT 2 12 FT OP4 DE2 GWN SYR

## (undated) DEVICE — BLADE RMR L41MM PAT PILOT H

## (undated) DEVICE — HOOD, PEEL-AWAY: Brand: FLYTE

## (undated) DEVICE — HYPODERMIC SAFETY NEEDLE: Brand: MAGELLAN

## (undated) DEVICE — CONMED SCOPE SAVER BITE BLOCK, 20X27 MM: Brand: SCOPE SAVER

## (undated) DEVICE — DRESSING CNTCT 4X12IN IS THERABOND 3D

## (undated) DEVICE — HANDPIECE SET WITH HIGH FLOW TIP AND SUCTION TUBE: Brand: INTERPULSE

## (undated) DEVICE — FORCEPS BX L240CM JAW DIA2.8MM L CAP W/ NDL MIC MESH TOOTH

## (undated) DEVICE — STERILE TOTAL KNEE DRAPE PACK: Brand: CARDINAL HEALTH

## (undated) DEVICE — YANKAUER,OPEN TIP,W/O VENT,STERILE: Brand: MEDLINE INDUSTRIES, INC.